# Patient Record
Sex: FEMALE | Race: WHITE | NOT HISPANIC OR LATINO
[De-identification: names, ages, dates, MRNs, and addresses within clinical notes are randomized per-mention and may not be internally consistent; named-entity substitution may affect disease eponyms.]

---

## 2017-02-23 ENCOUNTER — APPOINTMENT (OUTPATIENT)
Dept: FAMILY MEDICINE | Facility: CLINIC | Age: 52
End: 2017-02-23

## 2017-02-23 VITALS
SYSTOLIC BLOOD PRESSURE: 100 MMHG | DIASTOLIC BLOOD PRESSURE: 70 MMHG | BODY MASS INDEX: 21.69 KG/M2 | HEIGHT: 66 IN | WEIGHT: 135 LBS

## 2017-02-24 LAB
25(OH)D3 SERPL-MCNC: 10.8 NG/ML
ALBUMIN SERPL ELPH-MCNC: 4.3 G/DL
ALP BLD-CCNC: 62 U/L
ALT SERPL-CCNC: 13 U/L
ANION GAP SERPL CALC-SCNC: 15 MMOL/L
AST SERPL-CCNC: 21 U/L
BASOPHILS # BLD AUTO: 0.03 K/UL
BASOPHILS NFR BLD AUTO: 0.6 %
BILIRUB SERPL-MCNC: 0.6 MG/DL
BUN SERPL-MCNC: 13 MG/DL
CALCIUM SERPL-MCNC: 9.8 MG/DL
CHLORIDE SERPL-SCNC: 103 MMOL/L
CHOLEST SERPL-MCNC: 204 MG/DL
CHOLEST/HDLC SERPL: 2.8 RATIO
CO2 SERPL-SCNC: 25 MMOL/L
CREAT SERPL-MCNC: 1.14 MG/DL
EOSINOPHIL # BLD AUTO: 0.06 K/UL
EOSINOPHIL NFR BLD AUTO: 1.2 %
GLUCOSE SERPL-MCNC: 84 MG/DL
HBA1C MFR BLD HPLC: 6 %
HCT VFR BLD CALC: 41 %
HDLC SERPL-MCNC: 74 MG/DL
HGB BLD-MCNC: 13.4 G/DL
IMM GRANULOCYTES NFR BLD AUTO: 0 %
LDLC SERPL CALC-MCNC: 117 MG/DL
LYMPHOCYTES # BLD AUTO: 2.41 K/UL
LYMPHOCYTES NFR BLD AUTO: 49.8 %
MAN DIFF?: NORMAL
MCHC RBC-ENTMCNC: 29.3 PG
MCHC RBC-ENTMCNC: 32.7 GM/DL
MCV RBC AUTO: 89.5 FL
MONOCYTES # BLD AUTO: 0.35 K/UL
MONOCYTES NFR BLD AUTO: 7.2 %
NEUTROPHILS # BLD AUTO: 1.99 K/UL
NEUTROPHILS NFR BLD AUTO: 41.2 %
PLATELET # BLD AUTO: 180 K/UL
POTASSIUM SERPL-SCNC: 4 MMOL/L
PROT SERPL-MCNC: 7.2 G/DL
RBC # BLD: 4.58 M/UL
RBC # FLD: 13.9 %
SODIUM SERPL-SCNC: 143 MMOL/L
TRIGL SERPL-MCNC: 66 MG/DL
TSH SERPL-ACNC: 2.64 UIU/ML
WBC # FLD AUTO: 4.84 K/UL

## 2017-03-13 ENCOUNTER — MEDICATION RENEWAL (OUTPATIENT)
Age: 52
End: 2017-03-13

## 2017-03-27 ENCOUNTER — APPOINTMENT (OUTPATIENT)
Dept: INTERNAL MEDICINE | Facility: CLINIC | Age: 52
End: 2017-03-27

## 2017-03-27 VITALS
HEART RATE: 79 BPM | TEMPERATURE: 98.2 F | BODY MASS INDEX: 22.02 KG/M2 | DIASTOLIC BLOOD PRESSURE: 70 MMHG | WEIGHT: 137 LBS | HEIGHT: 66 IN | SYSTOLIC BLOOD PRESSURE: 113 MMHG | OXYGEN SATURATION: 100 %

## 2017-03-27 DIAGNOSIS — Z84.1 FAMILY HISTORY OF DISORDERS OF KIDNEY AND URETER: ICD-10-CM

## 2017-03-27 DIAGNOSIS — E11.9 TYPE 2 DIABETES MELLITUS W/OUT COMPLICATIONS: ICD-10-CM

## 2017-07-24 ENCOUNTER — RESULT REVIEW (OUTPATIENT)
Age: 52
End: 2017-07-24

## 2017-12-18 ENCOUNTER — APPOINTMENT (OUTPATIENT)
Dept: FAMILY MEDICINE | Facility: CLINIC | Age: 52
End: 2017-12-18
Payer: MEDICARE

## 2017-12-18 VITALS
BODY MASS INDEX: 21.53 KG/M2 | SYSTOLIC BLOOD PRESSURE: 120 MMHG | HEIGHT: 66 IN | DIASTOLIC BLOOD PRESSURE: 68 MMHG | WEIGHT: 134 LBS

## 2017-12-18 DIAGNOSIS — K52.9 NONINFECTIVE GASTROENTERITIS AND COLITIS, UNSPECIFIED: ICD-10-CM

## 2017-12-18 LAB — CYTOLOGY CVX/VAG DOC THIN PREP: NORMAL

## 2017-12-18 PROCEDURE — 99213 OFFICE O/P EST LOW 20 MIN: CPT

## 2017-12-31 ENCOUNTER — INPATIENT (INPATIENT)
Facility: HOSPITAL | Age: 52
LOS: 4 days | Discharge: HOME | End: 2018-01-05
Attending: INTERNAL MEDICINE | Admitting: INTERNAL MEDICINE
Payer: MEDICARE

## 2017-12-31 VITALS
TEMPERATURE: 100 F | DIASTOLIC BLOOD PRESSURE: 64 MMHG | HEART RATE: 101 BPM | OXYGEN SATURATION: 99 % | RESPIRATION RATE: 17 BRPM | WEIGHT: 126.99 LBS | HEIGHT: 67 IN | SYSTOLIC BLOOD PRESSURE: 116 MMHG

## 2017-12-31 LAB
ALBUMIN SERPL ELPH-MCNC: 3 G/DL — LOW (ref 3.3–5)
ALP SERPL-CCNC: 79 U/L — SIGNIFICANT CHANGE UP (ref 40–120)
ALT FLD-CCNC: 60 U/L — SIGNIFICANT CHANGE UP (ref 12–78)
ANION GAP SERPL CALC-SCNC: 7 MMOL/L — SIGNIFICANT CHANGE UP (ref 5–17)
APPEARANCE UR: CLEAR — SIGNIFICANT CHANGE UP
AST SERPL-CCNC: 41 U/L — HIGH (ref 15–37)
BACTERIA # UR AUTO: ABNORMAL
BASOPHILS # BLD AUTO: 0.2 K/UL — SIGNIFICANT CHANGE UP (ref 0–0.2)
BASOPHILS NFR BLD AUTO: 1.5 % — SIGNIFICANT CHANGE UP (ref 0–2)
BILIRUB SERPL-MCNC: 0.6 MG/DL — SIGNIFICANT CHANGE UP (ref 0.2–1.2)
BILIRUB UR-MCNC: NEGATIVE — SIGNIFICANT CHANGE UP
BUN SERPL-MCNC: 10 MG/DL — SIGNIFICANT CHANGE UP (ref 7–23)
CALCIUM SERPL-MCNC: 8.8 MG/DL — SIGNIFICANT CHANGE UP (ref 8.5–10.1)
CHLORIDE SERPL-SCNC: 99 MMOL/L — SIGNIFICANT CHANGE UP (ref 96–108)
CK MB CFR SERPL CALC: <0.5 NG/ML — SIGNIFICANT CHANGE UP (ref 0.5–3.6)
CO2 SERPL-SCNC: 30 MMOL/L — SIGNIFICANT CHANGE UP (ref 22–31)
COLOR SPEC: YELLOW — SIGNIFICANT CHANGE UP
CREAT SERPL-MCNC: 1.02 MG/DL — SIGNIFICANT CHANGE UP (ref 0.5–1.3)
DIFF PNL FLD: ABNORMAL
EOSINOPHIL # BLD AUTO: 0 K/UL — SIGNIFICANT CHANGE UP (ref 0–0.5)
EOSINOPHIL NFR BLD AUTO: 0.2 % — SIGNIFICANT CHANGE UP (ref 0–6)
EPI CELLS # UR: ABNORMAL
FLUAV SPEC QL CULT: NEGATIVE — SIGNIFICANT CHANGE UP
FLUBV AG SPEC QL IA: NEGATIVE — SIGNIFICANT CHANGE UP
GLUCOSE SERPL-MCNC: 101 MG/DL — HIGH (ref 70–99)
GLUCOSE UR QL: NEGATIVE MG/DL — SIGNIFICANT CHANGE UP
HCT VFR BLD CALC: 34.3 % — LOW (ref 34.5–45)
HGB BLD-MCNC: 11.3 G/DL — LOW (ref 11.5–15.5)
KETONES UR-MCNC: NEGATIVE — SIGNIFICANT CHANGE UP
LEUKOCYTE ESTERASE UR-ACNC: NEGATIVE — SIGNIFICANT CHANGE UP
LYMPHOCYTES # BLD AUTO: 1.5 K/UL — SIGNIFICANT CHANGE UP (ref 1–3.3)
LYMPHOCYTES # BLD AUTO: 13 % — SIGNIFICANT CHANGE UP (ref 13–44)
MAGNESIUM SERPL-MCNC: 2.3 MG/DL — SIGNIFICANT CHANGE UP (ref 1.6–2.6)
MCHC RBC-ENTMCNC: 29 PG — SIGNIFICANT CHANGE UP (ref 27–34)
MCHC RBC-ENTMCNC: 32.9 GM/DL — SIGNIFICANT CHANGE UP (ref 32–36)
MCV RBC AUTO: 87.9 FL — SIGNIFICANT CHANGE UP (ref 80–100)
MONOCYTES # BLD AUTO: 0.9 K/UL — SIGNIFICANT CHANGE UP (ref 0–0.9)
MONOCYTES NFR BLD AUTO: 7.5 % — SIGNIFICANT CHANGE UP (ref 2–14)
NEUTROPHILS # BLD AUTO: 9.1 K/UL — HIGH (ref 1.8–7.4)
NEUTROPHILS NFR BLD AUTO: 77.7 % — HIGH (ref 43–77)
NITRITE UR-MCNC: NEGATIVE — SIGNIFICANT CHANGE UP
PH UR: 6 — SIGNIFICANT CHANGE UP (ref 5–8)
PLATELET # BLD AUTO: 224 K/UL — SIGNIFICANT CHANGE UP (ref 150–400)
POTASSIUM SERPL-MCNC: 4.4 MMOL/L — SIGNIFICANT CHANGE UP (ref 3.5–5.3)
POTASSIUM SERPL-SCNC: 4.4 MMOL/L — SIGNIFICANT CHANGE UP (ref 3.5–5.3)
PROT SERPL-MCNC: 8.1 GM/DL — SIGNIFICANT CHANGE UP (ref 6–8.3)
PROT UR-MCNC: 15 MG/DL
RBC # BLD: 3.9 M/UL — SIGNIFICANT CHANGE UP (ref 3.8–5.2)
RBC # FLD: 13 % — SIGNIFICANT CHANGE UP (ref 11–15)
RBC CASTS # UR COMP ASSIST: SIGNIFICANT CHANGE UP /HPF (ref 0–4)
SODIUM SERPL-SCNC: 136 MMOL/L — SIGNIFICANT CHANGE UP (ref 135–145)
SP GR SPEC: 1.01 — SIGNIFICANT CHANGE UP (ref 1.01–1.02)
TROPONIN I SERPL-MCNC: 0.05 NG/ML — HIGH (ref 0.01–0.04)
UROBILINOGEN FLD QL: NEGATIVE MG/DL — SIGNIFICANT CHANGE UP
WBC # BLD: 11.6 K/UL — HIGH (ref 3.8–10.5)
WBC # FLD AUTO: 11.6 K/UL — HIGH (ref 3.8–10.5)

## 2017-12-31 PROCEDURE — 70450 CT HEAD/BRAIN W/O DYE: CPT | Mod: 26

## 2017-12-31 PROCEDURE — 62270 DX LMBR SPI PNXR: CPT

## 2017-12-31 PROCEDURE — 71010: CPT | Mod: 26

## 2017-12-31 PROCEDURE — 93010 ELECTROCARDIOGRAM REPORT: CPT

## 2017-12-31 PROCEDURE — 99285 EMERGENCY DEPT VISIT HI MDM: CPT | Mod: 25

## 2017-12-31 PROCEDURE — 99223 1ST HOSP IP/OBS HIGH 75: CPT

## 2017-12-31 RX ORDER — SODIUM CHLORIDE 9 MG/ML
2000 INJECTION INTRAMUSCULAR; INTRAVENOUS; SUBCUTANEOUS ONCE
Qty: 0 | Refills: 0 | Status: COMPLETED | OUTPATIENT
Start: 2017-12-31 | End: 2017-12-31

## 2017-12-31 RX ORDER — ACYCLOVIR SODIUM 500 MG
600 VIAL (EA) INTRAVENOUS ONCE
Qty: 0 | Refills: 0 | Status: COMPLETED | OUTPATIENT
Start: 2017-12-31 | End: 2018-01-01

## 2017-12-31 RX ORDER — SODIUM CHLORIDE 9 MG/ML
1000 INJECTION INTRAMUSCULAR; INTRAVENOUS; SUBCUTANEOUS ONCE
Qty: 0 | Refills: 0 | Status: COMPLETED | OUTPATIENT
Start: 2017-12-31 | End: 2017-12-31

## 2017-12-31 RX ORDER — METOCLOPRAMIDE HCL 10 MG
10 TABLET ORAL ONCE
Qty: 0 | Refills: 0 | Status: COMPLETED | OUTPATIENT
Start: 2017-12-31 | End: 2017-12-31

## 2017-12-31 RX ORDER — DEXAMETHASONE 0.5 MG/5ML
10 ELIXIR ORAL ONCE
Qty: 0 | Refills: 0 | Status: COMPLETED | OUTPATIENT
Start: 2017-12-31 | End: 2018-01-01

## 2017-12-31 RX ORDER — AMPICILLIN TRIHYDRATE 250 MG
2 CAPSULE ORAL ONCE
Qty: 0 | Refills: 0 | Status: COMPLETED | OUTPATIENT
Start: 2017-12-31 | End: 2018-01-01

## 2017-12-31 RX ORDER — CEFTRIAXONE 500 MG/1
2 INJECTION, POWDER, FOR SOLUTION INTRAMUSCULAR; INTRAVENOUS ONCE
Qty: 0 | Refills: 0 | Status: COMPLETED | OUTPATIENT
Start: 2017-12-31 | End: 2017-12-31

## 2017-12-31 RX ORDER — VANCOMYCIN HCL 1 G
1000 VIAL (EA) INTRAVENOUS ONCE
Qty: 0 | Refills: 0 | Status: COMPLETED | OUTPATIENT
Start: 2017-12-31 | End: 2018-01-01

## 2017-12-31 RX ORDER — DIPHENHYDRAMINE HCL 50 MG
25 CAPSULE ORAL ONCE
Qty: 0 | Refills: 0 | Status: COMPLETED | OUTPATIENT
Start: 2017-12-31 | End: 2017-12-31

## 2017-12-31 RX ORDER — DEXAMETHASONE 0.5 MG/5ML
10 ELIXIR ORAL ONCE
Qty: 0 | Refills: 0 | Status: DISCONTINUED | OUTPATIENT
Start: 2017-12-31 | End: 2017-12-31

## 2017-12-31 RX ADMIN — Medication 10 MILLIGRAM(S): at 19:17

## 2017-12-31 RX ADMIN — SODIUM CHLORIDE 1000 MILLILITER(S): 9 INJECTION INTRAMUSCULAR; INTRAVENOUS; SUBCUTANEOUS at 19:16

## 2017-12-31 RX ADMIN — SODIUM CHLORIDE 500 MILLILITER(S): 9 INJECTION INTRAMUSCULAR; INTRAVENOUS; SUBCUTANEOUS at 23:30

## 2017-12-31 RX ADMIN — CEFTRIAXONE 100 GRAM(S): 500 INJECTION, POWDER, FOR SOLUTION INTRAMUSCULAR; INTRAVENOUS at 23:30

## 2017-12-31 RX ADMIN — Medication 25 MILLIGRAM(S): at 19:17

## 2017-12-31 NOTE — ED ADULT TRIAGE NOTE - CHIEF COMPLAINT QUOTE
headache left side today and history of 2 strokes. She not feeling well, decreased appetite and weight loss

## 2017-12-31 NOTE — H&P ADULT - ASSESSMENT
52 woman with PMH of stroke x2 resulting in residual left sided weakness, mitral valve regurgitation presents with headache, weakness, mild confusion or "not feeling like myself" and poor appetite x 2 weeks; she also notes 20 pound weight loss over the past 2 weeks.  Signs, symptoms, and work up consistent with likely Meningitis.  Patient will require admission for at least 2 midnights as detailed below:    IMPROVE VTE Individual Risk Assessment          RISK                                                          Points    [  ] Previous VTE                                                3    [  ] Thrombophilia                                             2    [  ] Lower limb paralysis                                    2        (unable to hold up >15 seconds)      [  ] Current Cancer                                             2         (within 6 months)    [  ] Immobilization > 24 hrs                              1    [  ] ICU/CCU stay > 24 hours                            1    [  ] Age > 60                                                    1    IMPROVE VTE Score ____0_____

## 2017-12-31 NOTE — H&P ADULT - PROBLEM SELECTOR PLAN 1
Neurochecks Q2hrs.  Lumber puncture performed, follow up results.  Vancomycin 1gm IVPB Q12hrs  Ceftriaxone 2gm IVPB Q12hrs.  Ampicillin 1gm IVPB Q4hrs.  Acyclovir 600 mg IVPB q8h  Dexamethasone 10mg IVPB Q6hrs.  ID and Neurology consults  Blood cx 2 sets drawn in ER, f/u culture reports

## 2017-12-31 NOTE — H&P ADULT - HISTORY OF PRESENT ILLNESS
In progress, admitted for Meningitis. 52 woman with PMH of stroke x2 resulting in residual left sided weakness, mitral valve regurgitation presents with headache, weakness, mild confusion or "not feeling like myself" and poor appetite x 2 weeks; she also notes 20 pound weight loss over the past 2 weeks.  She denies fever, chills, photophobia, recent sick contacts, travel outside of [place], neck pain.  She says she take no medications and is otherwise healthy.    In the ED, patient's CT head read as likely infection, leptomeningial disease; less likely hemorrhage.  LP done in ED, results pending.  Tmax 102, CBC shows leukocytosis with left shift, troponin mildly elevated.  EKG NSR.

## 2017-12-31 NOTE — H&P ADULT - PROBLEM SELECTOR PLAN 3
Early ambulation for DVT prophylaxis   General precautions Very unlikely ACS given presentation   Will order second set

## 2017-12-31 NOTE — ED PROVIDER NOTE - MEDICAL DECISION MAKING DETAILS
patient pw weakness and poor appetite. Flu is a consideration, especially given fever in the ER. I have low suspicion for meningitis given the ha started gradually in the er while patient was waiting and had not eaten. it is not associated with meningeal signs such as jolt accentuation or kernigs. she has no photophobia. patients trop is elevated, which may be due to her MVR. Patient will need ct scan of the head. I read ekg as sisnus rhythm with inverted ts in the septal leads but no other ischemic changes or interval abnormalities. will need to admit. patient pw weakness and poor appetite. Flu is a consideration, especially given fever in the ER. I have low suspicion for meningitis given the ha started gradually in the er while patient was waiting and had not eaten. it is not associated with meningeal signs such as jolt accentuation or kernigs. she has no photophobia. patients trop is elevated, which may be due to her MVR. Patient will need ct scan of the head. I read ekg as sisnus rhythm with inverted ts in the septal leads but no other ischemic changes or interval abnormalities. will need to admit.  dago simon 11:50pm - pt was a signout pending CT. CT findings concerning for meningitis. ordered abx/steroids. and will perform LP. pt admitted. pt alert/oriented, answering questions appropriately, dev fever but SBP wnl. d/w pt and  regarding ppx abx - william bring their kids in for ppx abx.

## 2017-12-31 NOTE — ED PROVIDER NOTE - OBJECTIVE STATEMENT
Pertinent PMH/PSH/FHx/SHx and Review of Systems contained within:  52F hx of stroke x2, resulting in residual left sided weakness, mitral valve regurgitation pw headache, weakness and poor appetite. notes 20 pound weightloss over the past 2 weeks. no cough, congestion, bodyaches, fever, or chills. headache began while in the ER, gradual onset.   Fh and Sh not otherwise contributory  ROS otherwise negative

## 2017-12-31 NOTE — H&P ADULT - NSHPREVIEWOFSYSTEMS_GEN_ALL_CORE
No fever/chills, No photophobia/eye pain/changes in vision,  No chest pain/palpitations, no SOB/cough/wheeze/stridor, No abdominal pain, No N/V/D, no dysuria/frequency/discharge, No neck/back pain, no rash.

## 2017-12-31 NOTE — H&P ADULT - NSHPLABSRESULTS_GEN_ALL_CORE
Vital Signs Last 24 Hrs  T(C): 38 (31 Dec 2017 19:52), Max: 38.9 (31 Dec 2017 17:25)  T(F): 100.4 (31 Dec 2017 19:52), Max: 102 (31 Dec 2017 17:25)  HR: 88 (2018 02:15) (88 - 101)  BP: 110/42 (2018 02:15) (96/53 - 116/64)  BP(mean): --  RR: 18 (2018 02:15) (16 - 18)  SpO2: 97% (2018 02:15) (96% - 100%)        LABS:                        11.3   11.6  )-----------( 224      ( 31 Dec 2017 19:15 )             34.3         136  |  99  |  10  ----------------------------<  101<H>  4.4   |  30  |  1.02    Ca    8.8      31 Dec 2017 19:15  Mg     2.3         TPro  8.1  /  Alb  3.0<L>  /  TBili  0.6  /  DBili  x   /  AST  41<H>  /  ALT  60  /  AlkPhos  79  12      Urinalysis Basic - ( 31 Dec 2017 20:13 )    Color: Yellow / Appearance: Clear / S.010 / pH: x  Gluc: x / Ketone: Negative  / Bili: Negative / Urobili: Negative mg/dL   Blood: x / Protein: 15 mg/dL / Nitrite: Negative   Leuk Esterase: Negative / RBC: 0-2 /HPF / WBC x   Sq Epi: x / Non Sq Epi: Moderate / Bacteria: Few        RADIOLOGY & ADDITIONAL STUDIES:    CT head:  IMPRESSION:   Areas of hyperattenuation along the right central sulcus and left   temporal occipital region. Differential diagnostic considerations include   but are not limited to infection (i.e. meningitis), leptomeningeal   disease, and less likely hemorrhage given the multifocality without the   history of trauma in an atypical location for spontaneous hemorrhage.   Meningitis should be excluded on a clinical basis.

## 2018-01-01 DIAGNOSIS — G03.9 MENINGITIS, UNSPECIFIED: ICD-10-CM

## 2018-01-01 DIAGNOSIS — I63.9 CEREBRAL INFARCTION, UNSPECIFIED: ICD-10-CM

## 2018-01-01 DIAGNOSIS — Z29.9 ENCOUNTER FOR PROPHYLACTIC MEASURES, UNSPECIFIED: ICD-10-CM

## 2018-01-01 DIAGNOSIS — R74.8 ABNORMAL LEVELS OF OTHER SERUM ENZYMES: ICD-10-CM

## 2018-01-01 LAB
ANION GAP SERPL CALC-SCNC: 8 MMOL/L — SIGNIFICANT CHANGE UP (ref 5–17)
APPEARANCE CSF: CLEAR — SIGNIFICANT CHANGE UP
BUN SERPL-MCNC: 8 MG/DL — SIGNIFICANT CHANGE UP (ref 7–23)
CALCIUM SERPL-MCNC: 8.5 MG/DL — SIGNIFICANT CHANGE UP (ref 8.5–10.1)
CHLORIDE SERPL-SCNC: 103 MMOL/L — SIGNIFICANT CHANGE UP (ref 96–108)
CK MB BLD-MCNC: <0.7 % — SIGNIFICANT CHANGE UP (ref 0–3.5)
CK MB CFR SERPL CALC: <0.5 NG/ML — SIGNIFICANT CHANGE UP (ref 0.5–3.6)
CK SERPL-CCNC: 74 U/L — SIGNIFICANT CHANGE UP (ref 26–192)
CO2 SERPL-SCNC: 27 MMOL/L — SIGNIFICANT CHANGE UP (ref 22–31)
COLOR CSF: SIGNIFICANT CHANGE UP
CREAT SERPL-MCNC: 0.97 MG/DL — SIGNIFICANT CHANGE UP (ref 0.5–1.3)
CULTURE RESULTS: NO GROWTH — SIGNIFICANT CHANGE UP
GLUCOSE CSF-MCNC: 54 MG/DL — SIGNIFICANT CHANGE UP (ref 40–70)
GLUCOSE SERPL-MCNC: 126 MG/DL — HIGH (ref 70–99)
GRAM STN FLD: SIGNIFICANT CHANGE UP
HCT VFR BLD CALC: 35.2 % — SIGNIFICANT CHANGE UP (ref 34.5–45)
HGB BLD-MCNC: 11.5 G/DL — SIGNIFICANT CHANGE UP (ref 11.5–15.5)
LACTATE SERPL-SCNC: 0.8 MMOL/L — SIGNIFICANT CHANGE UP (ref 0.7–2)
LDH CSF L TO P-CCNC: 17 U/L — SIGNIFICANT CHANGE UP
LDH FLD-CCNC: 17 U/L — SIGNIFICANT CHANGE UP
LYMPHOCYTES # CSF: 1 % — LOW (ref 40–80)
MCHC RBC-ENTMCNC: 29 PG — SIGNIFICANT CHANGE UP (ref 27–34)
MCHC RBC-ENTMCNC: 32.8 GM/DL — SIGNIFICANT CHANGE UP (ref 32–36)
MCV RBC AUTO: 88.6 FL — SIGNIFICANT CHANGE UP (ref 80–100)
NEUTROPHILS # CSF: 0 % — SIGNIFICANT CHANGE UP (ref 0–6)
NIGHT BLUE STAIN TISS: SIGNIFICANT CHANGE UP
NRBC NFR CSF: 0 /UL — SIGNIFICANT CHANGE UP (ref 0–5)
PLATELET # BLD AUTO: 230 K/UL — SIGNIFICANT CHANGE UP (ref 150–400)
POTASSIUM SERPL-MCNC: 4.4 MMOL/L — SIGNIFICANT CHANGE UP (ref 3.5–5.3)
POTASSIUM SERPL-SCNC: 4.4 MMOL/L — SIGNIFICANT CHANGE UP (ref 3.5–5.3)
PROT CSF-MCNC: 48 MG/DL — HIGH (ref 15–45)
RBC # BLD: 3.97 M/UL — SIGNIFICANT CHANGE UP (ref 3.8–5.2)
RBC # CSF: 6 /UL — HIGH (ref 0–0)
RBC # FLD: 13.4 % — SIGNIFICANT CHANGE UP (ref 11–15)
SODIUM SERPL-SCNC: 138 MMOL/L — SIGNIFICANT CHANGE UP (ref 135–145)
SPECIMEN SOURCE: SIGNIFICANT CHANGE UP
TROPONIN I SERPL-MCNC: 0.05 NG/ML — HIGH (ref 0.01–0.04)
TUBE TYPE: SIGNIFICANT CHANGE UP
WBC # BLD: 14 K/UL — HIGH (ref 3.8–10.5)
WBC # FLD AUTO: 14 K/UL — HIGH (ref 3.8–10.5)

## 2018-01-01 PROCEDURE — 99233 SBSQ HOSP IP/OBS HIGH 50: CPT

## 2018-01-01 PROCEDURE — 70553 MRI BRAIN STEM W/O & W/DYE: CPT | Mod: 26

## 2018-01-01 PROCEDURE — 70496 CT ANGIOGRAPHY HEAD: CPT | Mod: 26

## 2018-01-01 PROCEDURE — 70544 MR ANGIOGRAPHY HEAD W/O DYE: CPT | Mod: 26,59

## 2018-01-01 RX ORDER — ACYCLOVIR SODIUM 500 MG
600 VIAL (EA) INTRAVENOUS EVERY 8 HOURS
Qty: 0 | Refills: 0 | Status: DISCONTINUED | OUTPATIENT
Start: 2018-01-01 | End: 2018-01-03

## 2018-01-01 RX ORDER — VANCOMYCIN HCL 1 G
1000 VIAL (EA) INTRAVENOUS EVERY 12 HOURS
Qty: 0 | Refills: 0 | Status: DISCONTINUED | OUTPATIENT
Start: 2018-01-01 | End: 2018-01-01

## 2018-01-01 RX ORDER — CEFTRIAXONE 500 MG/1
1 INJECTION, POWDER, FOR SOLUTION INTRAMUSCULAR; INTRAVENOUS EVERY 24 HOURS
Qty: 0 | Refills: 0 | Status: DISCONTINUED | OUTPATIENT
Start: 2018-01-02 | End: 2018-01-03

## 2018-01-01 RX ORDER — CEFTRIAXONE 500 MG/1
2 INJECTION, POWDER, FOR SOLUTION INTRAMUSCULAR; INTRAVENOUS EVERY 12 HOURS
Qty: 0 | Refills: 0 | Status: DISCONTINUED | OUTPATIENT
Start: 2018-01-01 | End: 2018-01-01

## 2018-01-01 RX ORDER — DEXAMETHASONE 0.5 MG/5ML
10 ELIXIR ORAL EVERY 6 HOURS
Qty: 0 | Refills: 0 | Status: DISCONTINUED | OUTPATIENT
Start: 2018-01-01 | End: 2018-01-03

## 2018-01-01 RX ORDER — AMPICILLIN TRIHYDRATE 250 MG
2 CAPSULE ORAL EVERY 6 HOURS
Qty: 0 | Refills: 0 | Status: DISCONTINUED | OUTPATIENT
Start: 2018-01-01 | End: 2018-01-01

## 2018-01-01 RX ADMIN — Medication 102 MILLIGRAM(S): at 15:52

## 2018-01-01 RX ADMIN — CEFTRIAXONE 100 GRAM(S): 500 INJECTION, POWDER, FOR SOLUTION INTRAMUSCULAR; INTRAVENOUS at 17:48

## 2018-01-01 RX ADMIN — Medication 102 MILLIGRAM(S): at 03:25

## 2018-01-01 RX ADMIN — Medication 250 MILLIGRAM(S): at 03:25

## 2018-01-01 RX ADMIN — Medication 216 GRAM(S): at 00:02

## 2018-01-01 RX ADMIN — Medication 216 GRAM(S): at 15:54

## 2018-01-01 RX ADMIN — Medication 112 MILLIGRAM(S): at 01:35

## 2018-01-01 RX ADMIN — Medication 112 MILLIGRAM(S): at 18:46

## 2018-01-01 RX ADMIN — Medication 112 MILLIGRAM(S): at 10:21

## 2018-01-01 NOTE — CONSULT NOTE ADULT - ASSESSMENT
Possible meningitis  Anorexia  2 Old strokes (1993, 2003)    - MRI brain wwo cezar, MRA head wo cezar  - follow up pending CSF results  - ID consult  - EEG
leucocytosis   lumbar puncture is NEGATIVE   mri noted   empiric antibiotics till sepsis work up is back   ua NEGATIVE   chest xray NEGATIVE   neuro follow up   lumbar puncture is NEGATIVE   will follow with you thanks

## 2018-01-01 NOTE — CONSULT NOTE ADULT - SUBJECTIVE AND OBJECTIVE BOX
HPI: 52 year old woman with hx of 2 strokes presenting with 2 week history of anorexia. She says she has a loss of appetite and only has been eating small meals here and there because she knows to eat. She has lost weight because of her poor appetite. She denies headaches, vision changes, new focal weakness, confusion, seizures, or paresthesias. She came to ER because she wants to know why she has a loss of appetite. She had a stroke in  (age 28) and etiology is unknown. She had another stroke in  (age 38) also etiology unknown. She was on aspirin and then Plavix but both medications caused GI bleeds and ulcers. She has not been on antiplatelet therapy. She denies any mew stroke symptoms. Ct head in ER showed possible meningitis and had LP.    PMHx: Stroke (, )  PSHx:   FHx: 3 sisters  from pancreatic cancer, MI, and respiratory failure in late 50s and early 60s, father  in old age and mother alive with DM2  Social Hx: non-smoker, no Etoh, no illicit drug use, takes care of 3 children and one is autistic  Meds: see MAR  Allergies: NKDA  ROS: loss of appetite    Vitals: Temp 98F (Tmax 102F)   HR 88   /58   RR 18  General: NAD, neck supple  Neuro: AOx3. No dysarthria, No aphasia. EOM intact. PERRL. VFF. No facial droop. Tongue is midline. Palate elevates symmetrically. Shoulder shrug is intact. Left hemiparesis with spasticity. Finger to nose intact on right and heel to shin intact bilaterally. Sensory intact to touch. Reflexes symmetric and brisker in on left and toes down.     Labs: WBC 14, lactate 0.8, CSF ( 54 glucose, 48 protein, 0 WBC, 6 RBC)  CT head- Areas of hyperattenuation along the right central sulcus and left   temporal occipital region. Differential diagnostic considerations include   but are not limited to infection (i.e. meningitis), leptomeningeal   disease, and less likely hemorrhage given the multifocality without the   history of trauma in an atypical location for spontaneous hemorrhage.   Meningitis should be excluded on a clinical basis.

## 2018-01-01 NOTE — PROGRESS NOTE ADULT - SUBJECTIVE AND OBJECTIVE BOX
Pt seen and examined, no event overnight.    No photophobia/eye pain/changes in vision,  No chest pain/palpitations, no SOB/cough/wheeze/stridor, No abdominal pain, No N/V/D, no dysuria/frequency/discharge, No neck/back pain, no rash      MEDICATIONS  (STANDING):  acyclovir IVPB 600 milliGRAM(s) IV Intermittent every 8 hours  ampicillin  IVPB 2 Gram(s) IV Intermittent every 6 hours  cefTRIAXone   IVPB 2 Gram(s) IV Intermittent every 12 hours  dexamethasone  IVPB 10 milliGRAM(s) IV Intermittent every 6 hours  vancomycin  IVPB 1000 milliGRAM(s) IV Intermittent every 12 hours    MEDICATIONS  (PRN):      Allergies    No Known Allergies        Vital Signs Last 24 Hrs  T(C): 37.3 (2018 07:55), Max: 38.9 (31 Dec 2017 17:25)  T(F): 99.1 (2018 07:55), Max: 102 (31 Dec 2017 17:25)  HR: 74 (2018 07:55) (74 - 101)  BP: 107/46 (2018 07:55) (96/53 - 116/64)  BP(mean): --  RR: 18 (2018 07:55) (16 - 18)  SpO2: 97% (2018 07:55) (96% - 100%)    PHYSICAL EXAM:    GENERAL: NAD, well-groomed, ill-appearing  	HEAD:  Atraumatic, Normocephalic  	EYES: EOMI, PERRLA, conjunctiva and sclera clear  	ENMT: No tonsillar erythema, exudates, or enlargement; Moist mucous membranes, No lesions  	NECK: Supple, No JVD, Normal thyroid  	NERVOUS SYSTEM:  Alert & Oriented X3, Good concentration DTRs 2+ intact and symmetric, strength 5/5, CN 2-12 intact, no meningeal signs  	CHEST/LUNG: Clear to ascultation bilaterally; No rales, rhonchi, wheezing, or rubs  	HEART: Regular rate and rhythm; No murmurs, rubs, or gallops  	ABDOMEN: Soft, Nontender, Nondistended; Bowel sounds present  	EXTREMITIES: No clubbing, cyanosis, or edema  	SKIN: no rashes or lesions   PSYCH: normal affect and behavior      LABS:                        11.5   14.0  )-----------( 230      ( 2018 07:52 )             35.2         138  |  103  |  8   ----------------------------<  126<H>  4.4   |  27  |  0.97    Ca    8.5      2018 07:52  Mg     2.3     12-31    TPro  8.1  /  Alb  3.0<L>  /  TBili  0.6  /  DBili  x   /  AST  41<H>  /  ALT  60  /  AlkPhos  79  12-      Urinalysis Basic - ( 31 Dec 2017 20:13 )    Color: Yellow / Appearance: Clear / S.010 / pH: x  Gluc: x / Ketone: Negative  / Bili: Negative / Urobili: Negative mg/dL   Blood: x / Protein: 15 mg/dL / Nitrite: Negative   Leuk Esterase: Negative / RBC: 0-2 /HPF / WBC x   Sq Epi: x / Non Sq Epi: Moderate / Bacteria: Few

## 2018-01-01 NOTE — PROGRESS NOTE ADULT - ASSESSMENT
52 woman with PMH of stroke x2 resulting in residual left sided weakness, mitral valve regurgitation presents with headache, weakness, mild confusion or "not feeling like myself" and poor appetite x 2 weeks; she also notes 20 pound weight loss over the past 2 weeks.  Signs, symptoms, and work up consistent with likely Meningitis.

## 2018-01-01 NOTE — CONSULT NOTE ADULT - SUBJECTIVE AND OBJECTIVE BOX
52 year old woman with hx of 2 strokes presenting with 2 week history of anorexia. She says she has a loss of appetite and only has been eating small meals here and there because she knows to eat. She has lost weight because of her poor appetite. She denies headaches, vision changes, new focal weakness, confusion, seizures, or paresthesias. She came to ER because she wants to know why she has a loss of appetite. She had a stroke in  (age 28) and etiology is unknown. She had another stroke in  (age 38) also etiology unknown. She was on aspirin and then Plavix but both medications caused GI bleeds and ulcers. She has not been on antiplatelet therapy. She denies any mew stroke symptoms. Ct head in ER showed possible meningitis and had LP.    No Known Allergies    Intolerances        MEDICATIONS  (STANDING):  acyclovir IVPB 600 milliGRAM(s) IV Intermittent every 8 hours  ampicillin  IVPB 2 Gram(s) IV Intermittent every 6 hours  cefTRIAXone   IVPB 2 Gram(s) IV Intermittent every 12 hours  dexamethasone  IVPB 10 milliGRAM(s) IV Intermittent every 6 hours  vancomycin  IVPB 1000 milliGRAM(s) IV Intermittent every 12 hours    MEDICATIONS  (PRN):      REVIEW OF SYSTEMS:    CONSTITUTIONAL: No fever, chills, weight loss, or fatigue  HEENT: No sore throat, runny nose, ear ache  RESPIRATORY: No cough, wheezing, No shortness of breath  CARDIOVASCULAR: No chest pain, palpitations, dizziness  GASTROINTESTINAL: No abdominal pain. No nausea, vomiting, diarrhea  GENITOURINARY: No dysuria, increase frequency, hematuria, or incontinence  NEUROLOGICAL: No headaches, memory loss, loss of strength, numbness, or tremors, no weakness  EXTREMITY: No pedal edema BLE  SKIN: No itching, burning, rashes, or lesions     VITAL SIGNS:  T(C): 36.7 (18 @ 16:01), Max: 38 (17 @ 19:52)  T(F): 98 (18 @ 16:01), Max: 100.4 (17 @ 19:52)  HR: 74 (18 @ 16:01) (74 - 93)  BP: 111/49 (18 @ 16:01) (96/53 - 111/49)  RR: 17 (18 @ 16:01) (16 - 18)  SpO2: 96% (18 @ 16:01) (96% - 98%)  Wt(kg): --    PHYSICAL EXAM:    GENERAL: not in any distress  HEENT: Neck is supple, normocephalic, atraumatic   CHEST/LUNG: Clear to auscultation bilaterally; No rales, rhonchi, wheezing  HEART: Regular rate and rhythm; No murmurs, rubs, or gallops  ABDOMEN: Soft, Nontender, Nondistended; Bowel sounds present, no rebound   EXTREMITIES:  2+ Peripheral Pulses, No clubbing, cyanosis, or edema  GENITOURINARY:   SKIN: No rashes or lesions  BACK: no pressor sore   NERVOUS SYSTEM:  Alert & Oriented X3, Good concentration  PSYCH: normal affect     LABS:                         11.5   14.0  )-----------( 230      ( 2018 07:52 )             35.2         138  |  103  |  8   ----------------------------<  126<H>  4.4   |  27  |  0.97    Ca    8.5      2018 07:52  Mg     2.3     12-    TPro  8.1  /  Alb  3.0<L>  /  TBili  0.6  /  DBili  x   /  AST  41<H>  /  ALT  60  /  AlkPhos  79  12-    LIVER FUNCTIONS - ( 31 Dec 2017 19:15 )  Alb: 3.0 g/dL / Pro: 8.1 gm/dL / ALK PHOS: 79 U/L / ALT: 60 U/L / AST: 41 U/L / GGT: x             Urinalysis Basic - ( 31 Dec 2017 20:13 )    Color: Yellow / Appearance: Clear / S.010 / pH: x  Gluc: x / Ketone: Negative  / Bili: Negative / Urobili: Negative mg/dL   Blood: x / Protein: 15 mg/dL / Nitrite: Negative   Leuk Esterase: Negative / RBC: 0-2 /HPF / WBC x   Sq Epi: x / Non Sq Epi: Moderate / Bacteria: Few        CARDIAC MARKERS ( 2018 07:52 )  .052 ng/mL / x     / 74 U/L / x     / <0.5 ng/mL  CARDIAC MARKERS ( 31 Dec 2017 19:15 )  .053 ng/mL / x     / x     / x     / <0.5 ng/mL                    Culture Results:   No growth ( @ 23:11)                Radiology: 52 year old woman with hx of 2 strokes presenting with 2 week history of anorexia. She says she has a loss of appetite and only has been eating small meals here and there because she knows to eat. She has lost weight because of her poor appetite. She denies headaches, vision changes, new focal weakness, confusion, seizures, or paresthesias. She came to ER because she wants to know why she has a loss of appetite. She had a stroke in  (age 28) and etiology is unknown. She had another stroke in  (age 38) also etiology unknown. She was on aspirin and then Plavix but both medications caused GI bleeds and ulcers. She has not been on antiplatelet therapy. She denies any mew stroke symptoms. Ct head in ER showed possible meningitis and had LP.    No Known Allergies    Intolerances        MEDICATIONS  (STANDING):  acyclovir IVPB 600 milliGRAM(s) IV Intermittent every 8 hours  ampicillin  IVPB 2 Gram(s) IV Intermittent every 6 hours  cefTRIAXone   IVPB 2 Gram(s) IV Intermittent every 12 hours  dexamethasone  IVPB 10 milliGRAM(s) IV Intermittent every 6 hours  vancomycin  IVPB 1000 milliGRAM(s) IV Intermittent every 12 hours    MEDICATIONS  (PRN):      REVIEW OF SYSTEMS:    unable to obtain     VITAL SIGNS:  T(C): 36.7 (18 @ 16:01), Max: 38 (17 @ 19:52)  T(F): 98 (18 @ 16:01), Max: 100.4 (17 @ 19:52)  HR: 74 (18 @ 16:01) (74 - 93)  BP: 111/49 (18 @ 16:01) (96/53 - 111/49)  RR: 17 (18 @ 16:01) (16 - 18)  SpO2: 96% (18 @ 16:01) (96% - 98%)  Wt(kg): --    PHYSICAL EXAM:    GENERAL: not in any distress  HEENT: Neck is supple, normocephalic, atraumatic   CHEST/LUNG: Clear to auscultation bilaterally; No rales, rhonchi, wheezing  HEART: Regular rate and rhythm; No murmurs, rubs, or gallops  ABDOMEN: Soft, Nontender, Nondistended; Bowel sounds present, no rebound   EXTREMITIES:  2+ Peripheral Pulses, No clubbing, cyanosis, or edema  SKIN: No rashes or lesions  BACK: no pressor sore   NERVOUS SYSTEM:  alert and oriented x3; left hemipareses   PSYCH: normal affect     LABS:                         11.5   14.0  )-----------( 230      ( 2018 07:52 )             35.2         138  |  103  |  8   ----------------------------<  126<H>  4.4   |  27  |  0.97    Ca    8.5      2018 07:52  Mg     2.3         TPro  8.1  /  Alb  3.0<L>  /  TBili  0.6  /  DBili  x   /  AST  41<H>  /  ALT  60  /  AlkPhos  79      LIVER FUNCTIONS - ( 31 Dec 2017 19:15 )  Alb: 3.0 g/dL / Pro: 8.1 gm/dL / ALK PHOS: 79 U/L / ALT: 60 U/L / AST: 41 U/L / GGT: x             Urinalysis Basic - ( 31 Dec 2017 20:13 )    Color: Yellow / Appearance: Clear / S.010 / pH: x  Gluc: x / Ketone: Negative  / Bili: Negative / Urobili: Negative mg/dL   Blood: x / Protein: 15 mg/dL / Nitrite: Negative   Leuk Esterase: Negative / RBC: 0-2 /HPF / WBC x   Sq Epi: x / Non Sq Epi: Moderate / Bacteria: Few        CARDIAC MARKERS ( 2018 07:52 )  .052 ng/mL / x     / 74 U/L / x     / <0.5 ng/mL  CARDIAC MARKERS ( 31 Dec 2017 19:15 )  .053 ng/mL / x     / x     / x     / <0.5 ng/mL                    Culture Results:   No growth ( @ 23:11)                Radiology:    < from: MR Head w/wo IV Cont (18 @ 20:21) >  IMPRESSION:         Likely bilateral subarachnoid hemorrhage. Left occipital periventricular   enhancing focus of hemorrhage. Underlying lesion cannot be entirely   excluded in   this area.    Punctate acute to subacute right parietal periventricular infarction    THIS REPORT CONTAINS FINDINGS THAT MAY BE CRITICAL TO PATIENT CARE. The   findings were verbally communicated via telephone conference with Dr. Bradshaw   at 8:54 PM EST on 2018. The findings were acknowledged and understood.          < end of copied text >    < from: MR Angio Head No Cont (18 @ 20:16) >  IMPRESSION:           Normal head/brain MRA.          ******PRELIMINARY REPORT******    ******PRELIMINARY REPORT******              POOJA GAMEZ M.D.;[facility] RADIOLOGIST                  < end of copied text >  < from: CT Head No Cont (17 @ 22:04) >  IMPRESSION:     Areas of hyperattenuation along the right central sulcus and left   temporal occipital region. Differential diagnostic considerations include   but are not limited to infection (i.e. meningitis), leptomeningeal   disease, and less likely hemorrhage given the multifocality without the   history of trauma in an atypical location for spontaneous hemorrhage.   Meningitis should be excluded on a clinical basis.    Dr. Joyner discussed the above findings with Dr. Crenshaw at 10:55 PM on   2017 with read back.                SABI JOYNER M.D., ATTENDING RADIOLOGIST  This document has been electronically signed. Dec 31 2017 10:59PM              < end of copied text > 52 year old woman with hx of 2 strokes presenting with 2 week history of anorexia. She says she has a loss of appetite and only has been eating small meals here and there because she knows to eat. She has lost weight because of her poor appetite. She denies headaches, vision changes, new focal weakness, confusion, seizures, or paresthesias. She came to ER because she wants to know why she has a loss of appetite. She had a stroke in  (age 28) and etiology is unknown. She had another stroke in  (age 38) also etiology unknown. She was on aspirin and then Plavix but both medications caused GI bleeds and ulcers. She has not been on antiplatelet therapy. She denies any mew stroke symptoms. Ct head in ER showed possible meningitis and had LP.    No Known Allergies    Intolerances        MEDICATIONS  (STANDING):  acyclovir IVPB 600 milliGRAM(s) IV Intermittent every 8 hours  ampicillin  IVPB 2 Gram(s) IV Intermittent every 6 hours  cefTRIAXone   IVPB 2 Gram(s) IV Intermittent every 12 hours  dexamethasone  IVPB 10 milliGRAM(s) IV Intermittent every 6 hours  vancomycin  IVPB 1000 milliGRAM(s) IV Intermittent every 12 hours    MEDICATIONS  (PRN):      REVIEW OF SYSTEMS:  apparently was confused earlier   now fine   had fevers   no cough short of breath   no nausea vomiting diarrhea     VITAL SIGNS:  T(C): 36.7 (18 @ 16:01), Max: 38 (17 @ 19:52)  T(F): 98 (18 @ 16:01), Max: 100.4 (17 @ 19:52)  HR: 74 (18 @ 16:01) (74 - 93)  BP: 111/49 (18 @ 16:01) (96/53 - 111/49)  RR: 17 (18 @ 16:01) (16 - 18)  SpO2: 96% (18 @ 16:01) (96% - 98%)  Wt(kg): --    PHYSICAL EXAM:    GENERAL: not in any distress  HEENT: Neck is supple, normocephalic, atraumatic   CHEST/LUNG: Clear to auscultation bilaterally; No rales, rhonchi, wheezing  HEART: Regular rate and rhythm; No murmurs, rubs, or gallops  ABDOMEN: Soft, Nontender, Nondistended; Bowel sounds present, no rebound   EXTREMITIES:  2+ Peripheral Pulses, No clubbing, cyanosis, or edema  SKIN: No rashes or lesions  BACK: no pressor sore   NERVOUS SYSTEM:  alert and oriented x3; left hemipareses   PSYCH: normal affect     LABS:                         11.5   14.0  )-----------( 230      ( 2018 07:52 )             35.2         138  |  103  |  8   ----------------------------<  126<H>  4.4   |  27  |  0.97    Ca    8.5      2018 07:52  Mg     2.3         TPro  8.1  /  Alb  3.0<L>  /  TBili  0.6  /  DBili  x   /  AST  41<H>  /  ALT  60  /  AlkPhos  79      LIVER FUNCTIONS - ( 31 Dec 2017 19:15 )  Alb: 3.0 g/dL / Pro: 8.1 gm/dL / ALK PHOS: 79 U/L / ALT: 60 U/L / AST: 41 U/L / GGT: x             Urinalysis Basic - ( 31 Dec 2017 20:13 )    Color: Yellow / Appearance: Clear / S.010 / pH: x  Gluc: x / Ketone: Negative  / Bili: Negative / Urobili: Negative mg/dL   Blood: x / Protein: 15 mg/dL / Nitrite: Negative   Leuk Esterase: Negative / RBC: 0-2 /HPF / WBC x   Sq Epi: x / Non Sq Epi: Moderate / Bacteria: Few        CARDIAC MARKERS ( 2018 07:52 )  .052 ng/mL / x     / 74 U/L / x     / <0.5 ng/mL  CARDIAC MARKERS ( 31 Dec 2017 19:15 )  .053 ng/mL / x     / x     / x     / <0.5 ng/mL                    Culture Results:   No growth ( @ 23:11)                Radiology:    < from: MR Head w/wo IV Cont (18 @ 20:21) >  IMPRESSION:         Likely bilateral subarachnoid hemorrhage. Left occipital periventricular   enhancing focus of hemorrhage. Underlying lesion cannot be entirely   excluded in   this area.    Punctate acute to subacute right parietal periventricular infarction    THIS REPORT CONTAINS FINDINGS THAT MAY BE CRITICAL TO PATIENT CARE. The   findings were verbally communicated via telephone conference with Dr. Bradshaw   at 8:54 PM EST on 2018. The findings were acknowledged and understood.          < end of copied text >    < from: MR Angio Head No Cont (18 @ 20:16) >  IMPRESSION:           Normal head/brain MRA.          ******PRELIMINARY REPORT******    ******PRELIMINARY REPORT******              POOJA GAMEZ M.D.;[facility] RADIOLOGIST                  < end of copied text >  < from: CT Head No Cont (17 @ 22:04) >  IMPRESSION:     Areas of hyperattenuation along the right central sulcus and left   temporal occipital region. Differential diagnostic considerations include   but are not limited to infection (i.e. meningitis), leptomeningeal   disease, and less likely hemorrhage given the multifocality without the   history of trauma in an atypical location for spontaneous hemorrhage.   Meningitis should be excluded on a clinical basis.    Dr. Joyner discussed the above findings with Dr. Crenshaw at 10:55 PM on   2017 with read back.                SABI JOYNER M.D., ATTENDING RADIOLOGIST  This document has been electronically signed. Dec 31 2017 10:59PM              < end of copied text >

## 2018-01-02 DIAGNOSIS — R93.0 ABNORMAL FINDINGS ON DIAGNOSTIC IMAGING OF SKULL AND HEAD, NOT ELSEWHERE CLASSIFIED: ICD-10-CM

## 2018-01-02 DIAGNOSIS — R41.0 DISORIENTATION, UNSPECIFIED: ICD-10-CM

## 2018-01-02 DIAGNOSIS — I34.0 NONRHEUMATIC MITRAL (VALVE) INSUFFICIENCY: ICD-10-CM

## 2018-01-02 DIAGNOSIS — R53.1 WEAKNESS: ICD-10-CM

## 2018-01-02 LAB
ALBUMIN SERPL ELPH-MCNC: 3.1 G/DL — LOW (ref 3.3–5)
ALP SERPL-CCNC: 93 U/L — SIGNIFICANT CHANGE UP (ref 40–120)
ALT FLD-CCNC: 61 U/L — SIGNIFICANT CHANGE UP (ref 12–78)
ANION GAP SERPL CALC-SCNC: 11 MMOL/L — SIGNIFICANT CHANGE UP (ref 5–17)
AST SERPL-CCNC: 36 U/L — SIGNIFICANT CHANGE UP (ref 15–37)
BILIRUB SERPL-MCNC: 0.6 MG/DL — SIGNIFICANT CHANGE UP (ref 0.2–1.2)
BUN SERPL-MCNC: 13 MG/DL — SIGNIFICANT CHANGE UP (ref 7–23)
CALCIUM SERPL-MCNC: 9.8 MG/DL — SIGNIFICANT CHANGE UP (ref 8.5–10.1)
CHLORIDE SERPL-SCNC: 105 MMOL/L — SIGNIFICANT CHANGE UP (ref 96–108)
CO2 SERPL-SCNC: 25 MMOL/L — SIGNIFICANT CHANGE UP (ref 22–31)
CREAT SERPL-MCNC: 0.97 MG/DL — SIGNIFICANT CHANGE UP (ref 0.5–1.3)
CRYPTOC AG CSF-ACNC: NEGATIVE — SIGNIFICANT CHANGE UP
ERYTHROCYTE [SEDIMENTATION RATE] IN BLOOD: 39 MM/HR — HIGH (ref 0–20)
GLUCOSE SERPL-MCNC: 141 MG/DL — HIGH (ref 70–99)
HCT VFR BLD CALC: 39.7 % — SIGNIFICANT CHANGE UP (ref 34.5–45)
HGB BLD-MCNC: 13 G/DL — SIGNIFICANT CHANGE UP (ref 11.5–15.5)
MCHC RBC-ENTMCNC: 29.1 PG — SIGNIFICANT CHANGE UP (ref 27–34)
MCHC RBC-ENTMCNC: 32.8 GM/DL — SIGNIFICANT CHANGE UP (ref 32–36)
MCV RBC AUTO: 88.9 FL — SIGNIFICANT CHANGE UP (ref 80–100)
PLATELET # BLD AUTO: 282 K/UL — SIGNIFICANT CHANGE UP (ref 150–400)
POTASSIUM SERPL-MCNC: 4.5 MMOL/L — SIGNIFICANT CHANGE UP (ref 3.5–5.3)
POTASSIUM SERPL-SCNC: 4.5 MMOL/L — SIGNIFICANT CHANGE UP (ref 3.5–5.3)
PROT SERPL-MCNC: 8.9 GM/DL — HIGH (ref 6–8.3)
RBC # BLD: 4.47 M/UL — SIGNIFICANT CHANGE UP (ref 3.8–5.2)
RBC # FLD: 13.1 % — SIGNIFICANT CHANGE UP (ref 11–15)
SODIUM SERPL-SCNC: 141 MMOL/L — SIGNIFICANT CHANGE UP (ref 135–145)
WBC # BLD: 21.5 K/UL — HIGH (ref 3.8–10.5)
WBC # FLD AUTO: 21.5 K/UL — HIGH (ref 3.8–10.5)

## 2018-01-02 PROCEDURE — 99233 SBSQ HOSP IP/OBS HIGH 50: CPT

## 2018-01-02 RX ORDER — VERAPAMIL HCL 240 MG
40 CAPSULE, EXTENDED RELEASE PELLETS 24 HR ORAL DAILY
Qty: 0 | Refills: 0 | Status: DISCONTINUED | OUTPATIENT
Start: 2018-01-02 | End: 2018-01-05

## 2018-01-02 RX ADMIN — Medication 112 MILLIGRAM(S): at 03:57

## 2018-01-02 RX ADMIN — Medication 112 MILLIGRAM(S): at 18:19

## 2018-01-02 RX ADMIN — Medication 102 MILLIGRAM(S): at 23:44

## 2018-01-02 RX ADMIN — CEFTRIAXONE 100 GRAM(S): 500 INJECTION, POWDER, FOR SOLUTION INTRAMUSCULAR; INTRAVENOUS at 21:27

## 2018-01-02 RX ADMIN — Medication 102 MILLIGRAM(S): at 12:39

## 2018-01-02 RX ADMIN — Medication 102 MILLIGRAM(S): at 00:28

## 2018-01-02 RX ADMIN — Medication 102 MILLIGRAM(S): at 18:19

## 2018-01-02 NOTE — PROGRESS NOTE ADULT - SUBJECTIVE AND OBJECTIVE BOX
Patient is a 52y old  Female who presented with a chief complaint of Weakness, lethargy, mild confusion, weight loss on 31 Dec 2017.  Notified by radiology on MR earlier today, pt had likely subarachnoid hemorrhage.    MR Head w/wo IV Cont (01.01.18 @ 20:21) >  Likely bilateral subarachnoid hemorrhage. Left occipital periventricular   enhancing focus of hemorrhage. Underlying lesion cannot be entirely   excluded in   this area.    Punctate acute to subacute right parietal periventricular infarction     Pt denied headache or neck pain, had sister who had cerebral aneurysm.  Discussed case with Dr Milena Fisher, she wanted CTA performed.  Pt had LP done earlier; Cerebrospinal Fluid Cell Count-1 (01.01.18 @ 01:15)    Total Nucleated Cell Count, CSF: 0 /uL    CSF Color: No Color    CSF Appearance: Clear    CSF Lymphocytes: 1 %    CSF Segmented Neutrophils: 0 %    Pt remained comfortable and stable with no acute neurological findings.  Results of CTA:    < from: CT Angio Head w/ IV Cont (01.01.18 @ 22:23) >  Noncontrast head CT: Stable high attenuation material in various sulci   which could represent hemorrhage or proteinaceous material. Stable small   focus of high attenuation in the left periatrial occipital lobe, presumed   hemorrhage based on MRI findings. Stable additional chronic findings as   above.    CTA head and neck:   No major vessel occlusion or hemodynamically significant stenosis. 2 tiny   foci of contrast within the left periatrial occipital lobe, in the region   of hemorrhage with associated enhancement on the recent MRI, which could   potentially represent pseudoaneurysms of either venous or arterial   etiology. This would be better characterized with conventional angiogram.    Discussed with radiologist who read CTA; pt would likely need conventional angiogram performed non urgently.  Message left for Dr Fisher.    Care Discussed with Consultants/Other Providers [x ] YES  [ ] NO

## 2018-01-02 NOTE — PROGRESS NOTE ADULT - SUBJECTIVE AND OBJECTIVE BOX
CC/F/U for: r/o intracerebral aneurysm    HPI:  52 woman with PMH of stroke x2 resulting in residual left sided weakness, mitral valve regurgitation presents with headache, weakness, mild confusion or "not feeling like myself" and poor appetite x 2 weeks; she also notes 20 pound weight loss over the past 2 weeks.  She denies fever, chills, photophobia, recent sick contacts, travel outside of [place], neck pain.  She says she take no medications and is otherwise healthy.    In the ED, patient's CT head read as likely infection, leptomeningial disease; less likely hemorrhage.  LP done in ED, results pending.  Tmax 102, CBC shows leukocytosis with left shift, troponin mildly elevated.  EKG NSR. (31 Dec 2017 23:43)        INTERVAL HPI/OVERNIGHT EVENTS:  Pt seen and examined at bedside, [facility] family.     Allergies/Intolerance: No Known Allergies      MEDICATIONS  (STANDING):  acyclovir IVPB 600 milliGRAM(s) IV Intermittent every 8 hours  cefTRIAXone   IVPB 1 Gram(s) IV Intermittent every 24 hours  dexamethasone  IVPB 10 milliGRAM(s) IV Intermittent every 6 hours    MEDICATIONS  (PRN):        ROS: as above; all other systems reviewed and wnl      PHYSICAL EXAMINATION:  Vital Signs Last 24 Hrs  T(C): 36 (2018 12:40), Max: 36.8 (2018 21:35)  T(F): 96.8 (2018 12:40), Max: 98.2 (2018 21:35)  HR: 88 (2018 12:40) (74 - 107)  BP: 138/74 (2018 12:40) (111/49 - 147/82)  RR: 18 (2018 12:40) (17 - 18)  SpO2: 100% (2018 12:40) (96% - 100%)      GENERAL: young female; NAD, well-groomed, well-developed  HEAD:  atraumatic, normocephalic  EYES: sclera anicteric  ENMT: mucous membranes moist  NECK: supple, No JVD  CHEST/LUNG: respirations unlabored; air entry symmetric; clear to auscultation bilaterally; no rales, rhonchi, or wheezing b/l  HEART: normal S1, S2  ABDOMEN: BS+, soft, ND, NT   EXTREMITIES:  pulses palpable; no clubbing, cyanosis, or edema b/l LEs  NEURO: awake, alert, interactive; LUE weakness (chronic) > LLE weakness  SKIN: no rashes or lesions      LABS:                        13.0   21.5  )-----------( 282      ( 2018 04:29 )             39.7         141  |  105  |  13  ----------------------------<  141<H>  4.5   |  25  |  0.97    Ca    9.8      2018 04:29  Mg     2.3         TPro  8.9<H>  /  Alb  3.1<L>  /  TBili  0.6  /  DBili  x   /  AST  36  /  ALT  61  /  AlkPhos  93        Urinalysis Basic - ( 31 Dec 2017 20:13 )    Color: Yellow / Appearance: Clear / S.010 / pH: x  Gluc: x / Ketone: Negative  / Bili: Negative / Urobili: Negative mg/dL   Blood: x / Protein: 15 mg/dL / Nitrite: Negative   Leuk Esterase: Negative / RBC: 0-2 /HPF / WBC x   Sq Epi: x / Non Sq Epi: Moderate / Bacteria: Few      ASSESSMENT AND PLAN:  Sharlene Patel adm  [id number]   52F, Mitral regurg, adm w/weakness, headache, confusion, decr appetitis, weight loss (20lbs in 2 weeks), fever, WBC 11.6, CT head c/f likely infection/leptomeningeal dz, s/p LP in ED w/NEGative results, influenza neg, CXR neg, MRI/MRA c/f R MCA aneurysm - for transfer to [facility] for angiogram as per Neuro/AHusain   -continue nimotop as per Neuro   -awaiting transfer CC/F/U for: r/o intracerebral aneurysm    HPI:  52 woman with PMH of stroke x2 resulting in residual left sided weakness, mitral valve regurgitation presents with headache, weakness, mild confusion or "not feeling like myself" and poor appetite x 2 weeks; she also notes 20 pound weight loss over the past 2 weeks.  She denies fever, chills, photophobia, recent sick contacts, travel outside of [place], neck pain.  She says she take no medications and is otherwise healthy.    In the ED, patient's CT head read as likely infection, leptomeningial disease; less likely hemorrhage.  LP done in ED, results pending.  Tmax 102, CBC shows leukocytosis with left shift, troponin mildly elevated.  EKG NSR. (31 Dec 2017 23:43)        INTERVAL HPI/OVERNIGHT EVENTS:  Pt seen and examined at bedside, [facility] family.     Allergies/Intolerance: No Known Allergies      MEDICATIONS  (STANDING):  acyclovir IVPB 600 milliGRAM(s) IV Intermittent every 8 hours  cefTRIAXone   IVPB 1 Gram(s) IV Intermittent every 24 hours  dexamethasone  IVPB 10 milliGRAM(s) IV Intermittent every 6 hours    MEDICATIONS  (PRN):        ROS: as above; all other systems reviewed and wnl      PHYSICAL EXAMINATION:  Vital Signs Last 24 Hrs  T(C): 36 (2018 12:40), Max: 36.8 (2018 21:35)  T(F): 96.8 (2018 12:40), Max: 98.2 (2018 21:35)  HR: 88 (2018 12:40) (74 - 107)  BP: 138/74 (2018 12:40) (111/49 - 147/82)  RR: 18 (2018 12:40) (17 - 18)  SpO2: 100% (2018 12:40) (96% - 100%)      GENERAL: young female; NAD, well-groomed, well-developed  HEAD:  atraumatic, normocephalic  EYES: sclera anicteric  ENMT: mucous membranes moist  NECK: supple, No JVD  CHEST/LUNG: respirations unlabored; air entry symmetric; clear to auscultation bilaterally; no rales, rhonchi, or wheezing b/l  HEART: normal S1, S2, 2/6SM  ABDOMEN: BS+, soft, ND, NT   EXTREMITIES:  pulses palpable; no clubbing, cyanosis, or edema b/l LEs  NEURO: awake, alert, interactive; LUE weakness (chronic) > LLE weakness  SKIN: no rashes or lesions      LABS:                        13.0   21.5  )-----------( 282      ( 2018 04:29 )             39.7     -02    141  |  105  |  13  ----------------------------<  141<H>  4.5   |  25  |  0.97    Ca    9.8      2018 04:29  Mg     2.3     12-31    TPro  8.9<H>  /  Alb  3.1<L>  /  TBili  0.6  /  DBili  x   /  AST  36  /  ALT  61  /  AlkPhos  93  01-02      Urinalysis Basic - ( 31 Dec 2017 20:13 )    Color: Yellow / Appearance: Clear / S.010 / pH: x  Gluc: x / Ketone: Negative  / Bili: Negative / Urobili: Negative mg/dL   Blood: x / Protein: 15 mg/dL / Nitrite: Negative   Leuk Esterase: Negative / RBC: 0-2 /HPF / WBC x   Sq Epi: x / Non Sq Epi: Moderate / Bacteria: Few

## 2018-01-02 NOTE — PROGRESS NOTE ADULT - ASSESSMENT
fever  ? hemorrhage   management per neurology   high wbc ?? decadron   plan to dc all antibiotics if stable and culture NEGATIVE am   will follow with you thanks

## 2018-01-02 NOTE — PROGRESS NOTE ADULT - ASSESSMENT
ASSESSMENT AND PLAN:  52F, Mitral regurg, hx prior CVA/L weakness, UE>LE; adm w/weakness, headache, confusion, decr appetite, weight loss (20lbs in 2 weeks), fever, WBC 11.6, CT head c/f likely infection/leptomeningeal dz, s/p LP in ED w/NEGative results, influenza neg, CXR neg, MRI/MRA c/f R MCA aneurysm - for transfer to [facility] for angiogram as per Neuro/AHusain    Acute neuro process - r/o aneurysm  -continue regimen of ceftriaxone, acyclovir, dexamethasone, verapimil as per Neuro/ID  -f/u cxs - neg so far  -awaiting transfer

## 2018-01-02 NOTE — PROGRESS NOTE ADULT - SUBJECTIVE AND OBJECTIVE BOX
Neurology Progress Note    No acute events. Patient had MRI brain which showed SAH and no evidence for meningitis. CTA head shows possible AVM, aneurysm    Neuro Exam: AOx3. Follows commands. No dysarthria. Left hemiparesis with spasticity    MRI brain wwo - Likely bilateral subarachnoid hemorrhage. Left occipital periventricular enhancing focus of hemorrhage. Underlying lesion cannot be entirely excluded in this area. Punctate acute to subacute right parietal periventricular infarction   MRA head- normal  Noncontrast head CT: Stable high attenuation material in various sulci   which could represent hemorrhage or proteinaceous material. Stable small   focus of high attenuation in the left periatrial occipital lobe, presumed   hemorrhage based on MRI findings. Stable additional chronic findings as   above.    CTA head and neck:   No major vessel occlusion or hemodynamically significant stenosis. 2 tiny   foci of contrast within the left periatrial occipital lobe, in the region   of hemorrhage with associated enhancement on the recent MRI, which could   potentially represent pseudoaneurysms of either venous or arterial   etiology. This would be better characterized with conventional angiogram.      A/P:  SAH (nontraumatic)  Possible AVM or cerebral aneurysm  Strokes (1993, 2003)    - She will need conventional angiogram. Transfer to [facility]  - Start Nimotop 60mg q 4 hrs for possible aneurysm  - I discussed with the patient at length the possibilities. Neurology Progress Note    No acute events. Patient had MRI brain which showed SAH and no evidence for meningitis. CTA head shows possible AVM, aneurysm    Neuro Exam: AOx3. Follows commands. No dysarthria. Left hemiparesis with spasticity    MRI brain wwo - Likely bilateral subarachnoid hemorrhage. Left occipital periventricular enhancing focus of hemorrhage. Underlying lesion cannot be entirely excluded in this area. Punctate acute to subacute right parietal periventricular infarction   MRA head- normal  Noncontrast head CT: Stable high attenuation material in various sulci   which could represent hemorrhage or proteinaceous material. Stable small   focus of high attenuation in the left periatrial occipital lobe, presumed   hemorrhage based on MRI findings. Stable additional chronic findings as   above.    CTA head and neck:   No major vessel occlusion or hemodynamically significant stenosis. 2 tiny   foci of contrast within the left periatrial occipital lobe, in the region   of hemorrhage with associated enhancement on the recent MRI, which could   potentially represent pseudoaneurysms of either venous or arterial   etiology. This would be better characterized with conventional angiogram.      A/P:  SAH (nontraumatic)  Possible AVM or cerebral aneurysm  Strokes (1993, 2003)    - She will need conventional angiogram. Transfer to [place]  - Start Nimotop 60mg q 4 hrs for possible aneurysm  - I discussed with the patient at length the possibilities.     ADDENDUM:  I discussed the case with Dr. Wright (Stroke attending) and Dr. Pastor (Neurosurgery attending) at [place] and Dr. Pastor did not think a transfer was necessary. He told me that she can follow with him in his office as outpatient for conventional angiogram. 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 We will start verapamil 80mg daily. Obtain vasculitis work up. D/C home tomorrow if repeat CT head unremarkable.

## 2018-01-02 NOTE — PROGRESS NOTE ADULT - SUBJECTIVE AND OBJECTIVE BOX
HPI:  52 woman with PMH of stroke x2 resulting in residual left sided weakness, mitral valve regurgitation presents with headache, weakness, mild confusion or "not feeling like myself" and poor appetite x 2 weeks; she also notes 20 pound weight loss over the past 2 weeks.  She denies fever, chills, photophobia, recent sick contacts, travel outside of [place], neck pain.  She says she take no medications and is otherwise healthy.  In the ED, patient's CT head read as likely infection, leptomeningial disease; less likely hemorrhage.  LP done in ED, results pending.  Tmax 102, CBC shows leukocytosis with left shift, troponin mildly elevated.  EKG NSR. (31 Dec 2017 23:43)  pts work up not really consistent with meningitis   neuro notes seen     Allergies    No Known Allergies    Intolerances        MEDICATIONS  (STANDING):  acyclovir IVPB 600 milliGRAM(s) IV Intermittent every 8 hours  cefTRIAXone   IVPB 1 Gram(s) IV Intermittent every 24 hours  dexamethasone  IVPB 10 milliGRAM(s) IV Intermittent every 6 hours  verapamil 40 milliGRAM(s) Oral daily    MEDICATIONS  (PRN):      REVIEW OF SYSTEMS:    feels fine   no cough short of breath nausea vomiting diarrhea     VITAL SIGNS:  T(C): 36 (18 @ 12:40), Max: 36.8 (18 @ 21:35)  T(F): 96.8 (18 @ 12:40), Max: 98.2 (18 @ 21:35)  HR: 88 (18 @ 12:40) (78 - 107)  BP: 138/74 (18 @ 12:40) (121/61 - 147/82)  RR: 18 (18 @ 12:40) (18 - 18)  SpO2: 100% (18 @ 12:40) (98% - 100%)  Wt(kg): --    PHYSICAL EXAM:    GENERAL: not in any distress  HEENT: Neck is supple, normocephalic, atraumatic   CHEST/LUNG: Clear to auscultation bilaterally; No rales, rhonchi, wheezing  HEART: Regular rate and rhythm; 4/6 systolic  murmurs,  no  rubs, or gallops  ABDOMEN: Soft, Nontender, Nondistended; Bowel sounds present, no rebound   EXTREMITIES:  2+ Peripheral Pulses, No clubbing, cyanosis, or edema  no change in status   SKIN: No rashes or lesions  BACK: no pressor sore   NERVOUS SYSTEM:  Alert & Oriented X3, Good concentration  PSYCH: normal affect     LABS:                         13.0   21.5  )-----------( 282      ( 2018 04:29 )             39.7         141  |  105  |  13  ----------------------------<  141<H>  4.5   |  25  |  0.97    Ca    9.8      2018 04:29  Mg     2.3         TPro  8.9<H>  /  Alb  3.1<L>  /  TBili  0.6  /  DBili  x   /  AST  36  /  ALT  61  /  AlkPhos  93      LIVER FUNCTIONS - ( 2018 04:29 )  Alb: 3.1 g/dL / Pro: 8.9 gm/dL / ALK PHOS: 93 U/L / ALT: 61 U/L / AST: 36 U/L / GGT: x             Urinalysis Basic - ( 31 Dec 2017 20:13 )    Color: Yellow / Appearance: Clear / S.010 / pH: x  Gluc: x / Ketone: Negative  / Bili: Negative / Urobili: Negative mg/dL   Blood: x / Protein: 15 mg/dL / Nitrite: Negative   Leuk Esterase: Negative / RBC: 0-2 /HPF / WBC x   Sq Epi: x / Non Sq Epi: Moderate / Bacteria: Few        CARDIAC MARKERS ( 2018 07:52 )  .052 ng/mL / x     / 74 U/L / x     / <0.5 ng/mL  CARDIAC MARKERS ( 31 Dec 2017 19:15 )  .053 ng/mL / x     / x     / x     / <0.5 ng/mL                    Culture Results:   No growth to date. ( @ 13:09)  Culture Results:   No growth to date. ( @ 13:09)  Culture Results:   No growth ( @ 03:33)  Culture Results:   No growth ( @ 23:11)                Radiology:  < from: CT Angio Head w/ IV Cont (18 @ 22:23) >  IMPRESSION:  Noncontrast head CT: Stable high attenuation material in various sulci   which could represent hemorrhage or proteinaceous material. Stable small   focus of high attenuation in the left periatrial occipital lobe, presumed   hemorrhage based on MRI findings. Stable additional chronic findings as   above.    CTA head and neck:   No major vessel occlusion or hemodynamically significant stenosis. 2 tiny   foci of contrast within the left periatrial occipital lobe, in the region   of hemorrhage with associated enhancement on the recent MRI, which could   potentially represent pseudoaneurysms of either venous or arterial   etiology. This would be better characterized with conventional angiogram.      I discussed the findings with Dr. Velasco at 1:25 AM on 2018 with   read back verification.                  < end of copied text >

## 2018-01-03 LAB
ANION GAP SERPL CALC-SCNC: 10 MMOL/L — SIGNIFICANT CHANGE UP (ref 5–17)
ANTI-RIBONUCLEAR PROTEIN: 0.4 AI — SIGNIFICANT CHANGE UP
AUTO DIFF PNL BLD: NEGATIVE — SIGNIFICANT CHANGE UP
B BURGDOR DNA SPEC QL NAA+PROBE: NEGATIVE — SIGNIFICANT CHANGE UP
BASOPHILS # BLD AUTO: 0 K/UL — SIGNIFICANT CHANGE UP (ref 0–0.2)
BASOPHILS NFR BLD AUTO: 0.1 % — SIGNIFICANT CHANGE UP (ref 0–2)
BUN SERPL-MCNC: 19 MG/DL — SIGNIFICANT CHANGE UP (ref 7–23)
C-ANCA SER-ACNC: NEGATIVE — SIGNIFICANT CHANGE UP
C3 SERPL-MCNC: 167 MG/DL — SIGNIFICANT CHANGE UP (ref 80–180)
C4 SERPL-MCNC: 48 MG/DL — HIGH (ref 10–45)
CALCIUM SERPL-MCNC: 9.5 MG/DL — SIGNIFICANT CHANGE UP (ref 8.5–10.1)
CARDIOLIPIN AB SER-ACNC: NEGATIVE — SIGNIFICANT CHANGE UP
CHLORIDE SERPL-SCNC: 104 MMOL/L — SIGNIFICANT CHANGE UP (ref 96–108)
CK MB BLD-MCNC: 2.3 % — SIGNIFICANT CHANGE UP (ref 0–3.5)
CK MB CFR SERPL CALC: 1.6 NG/ML — SIGNIFICANT CHANGE UP (ref 0.5–3.6)
CK SERPL-CCNC: 69 U/L — SIGNIFICANT CHANGE UP (ref 26–192)
CO2 SERPL-SCNC: 26 MMOL/L — SIGNIFICANT CHANGE UP (ref 22–31)
CREAT SERPL-MCNC: 1.01 MG/DL — SIGNIFICANT CHANGE UP (ref 0.5–1.3)
CRP SERPL-MCNC: 1.8 MG/DL — HIGH (ref 0–0.4)
ENA SM AB FLD QL: <0.2 AI — SIGNIFICANT CHANGE UP
EOSINOPHIL # BLD AUTO: 0 K/UL — SIGNIFICANT CHANGE UP (ref 0–0.5)
EOSINOPHIL NFR BLD AUTO: 0 % — SIGNIFICANT CHANGE UP (ref 0–6)
FACT VIII ACT/NOR PPP: 346 % — HIGH (ref 60–125)
GLUCOSE SERPL-MCNC: 143 MG/DL — HIGH (ref 70–99)
HCT VFR BLD CALC: 38.6 % — SIGNIFICANT CHANGE UP (ref 34.5–45)
HGB BLD-MCNC: 12.4 G/DL — SIGNIFICANT CHANGE UP (ref 11.5–15.5)
LYMPHOCYTES # BLD AUTO: 1 K/UL — SIGNIFICANT CHANGE UP (ref 1–3.3)
LYMPHOCYTES # BLD AUTO: 3.3 % — LOW (ref 13–44)
MAGNESIUM SERPL-MCNC: 2.4 MG/DL — SIGNIFICANT CHANGE UP (ref 1.6–2.6)
MCHC RBC-ENTMCNC: 28.7 PG — SIGNIFICANT CHANGE UP (ref 27–34)
MCHC RBC-ENTMCNC: 32.2 GM/DL — SIGNIFICANT CHANGE UP (ref 32–36)
MCV RBC AUTO: 89 FL — SIGNIFICANT CHANGE UP (ref 80–100)
MONOCYTES # BLD AUTO: 0.6 K/UL — SIGNIFICANT CHANGE UP (ref 0–0.9)
MONOCYTES NFR BLD AUTO: 1.9 % — LOW (ref 2–14)
NEUTROPHILS # BLD AUTO: 28.2 K/UL — HIGH (ref 1.8–7.4)
NEUTROPHILS NFR BLD AUTO: 94.7 % — HIGH (ref 43–77)
P-ANCA SER-ACNC: NEGATIVE — SIGNIFICANT CHANGE UP
PHOSPHATE SERPL-MCNC: 4.2 MG/DL — SIGNIFICANT CHANGE UP (ref 2.5–4.5)
PLATELET # BLD AUTO: 310 K/UL — SIGNIFICANT CHANGE UP (ref 150–400)
POTASSIUM SERPL-MCNC: 5 MMOL/L — SIGNIFICANT CHANGE UP (ref 3.5–5.3)
POTASSIUM SERPL-SCNC: 5 MMOL/L — SIGNIFICANT CHANGE UP (ref 3.5–5.3)
RBC # BLD: 4.33 M/UL — SIGNIFICANT CHANGE UP (ref 3.8–5.2)
RBC # FLD: 13.5 % — SIGNIFICANT CHANGE UP (ref 11–15)
SODIUM SERPL-SCNC: 140 MMOL/L — SIGNIFICANT CHANGE UP (ref 135–145)
TROPONIN I SERPL-MCNC: 0.08 NG/ML — HIGH (ref 0.01–0.04)
TROPONIN I SERPL-MCNC: 0.09 NG/ML — HIGH (ref 0.01–0.04)
VDRL CSF-TITR: NEGATIVE — SIGNIFICANT CHANGE UP
WBC # BLD: 29.7 K/UL — HIGH (ref 3.8–10.5)
WBC # FLD AUTO: 29.7 K/UL — HIGH (ref 3.8–10.5)

## 2018-01-03 PROCEDURE — 70450 CT HEAD/BRAIN W/O DYE: CPT | Mod: 26

## 2018-01-03 PROCEDURE — 99233 SBSQ HOSP IP/OBS HIGH 50: CPT

## 2018-01-03 PROCEDURE — 93010 ELECTROCARDIOGRAM REPORT: CPT

## 2018-01-03 RX ADMIN — Medication 40 MILLIGRAM(S): at 11:59

## 2018-01-03 RX ADMIN — Medication 112 MILLIGRAM(S): at 16:59

## 2018-01-03 RX ADMIN — Medication 112 MILLIGRAM(S): at 03:48

## 2018-01-03 RX ADMIN — Medication 102 MILLIGRAM(S): at 05:25

## 2018-01-03 NOTE — PROGRESS NOTE ADULT - SUBJECTIVE AND OBJECTIVE BOX
CC/F/U for: weakness, r/o intracerebral aneurysm    HPI:  52 woman with PMH of stroke x2 resulting in residual left sided weakness, mitral valve regurgitation presents with headache, weakness, mild confusion or "not feeling like myself" and poor appetite x 2 weeks; she also notes 20 pound weight loss over the past 2 weeks.  She denies fever, chills, photophobia, recent sick contacts, travel outside of [place], neck pain.  She says she take no medications and is otherwise healthy.    In the ED, patient's CT head read as likely infection, leptomeningial disease; less likely hemorrhage.  LP done in ED, results pending.  Tmax 102, CBC shows leukocytosis with left shift, troponin mildly elevated.  EKG NSR. (31 Dec 2017 23:43)        INTERVAL HPI/OVERNIGHT EVENTS:  Pt seen and examined at bedside  -repeat CT head today       Allergies/Intolerance: No Known Allergies      MEDICATIONS  (STANDING):  acyclovir IVPB 600 milliGRAM(s) IV Intermittent every 8 hours  cefTRIAXone   IVPB 1 Gram(s) IV Intermittent every 24 hours  verapamil 40 milliGRAM(s) Oral daily    MEDICATIONS  (PRN):        ROS: as above; all other systems reviewed and wnl      PHYSICAL EXAMINATION:  Vital Signs Last 24 Hrs  T(C): 36.2 (03 Jan 2018 11:52), Max: 36.6 (02 Jan 2018 18:25)  T(F): 97.2 (03 Jan 2018 11:52), Max: 97.9 (02 Jan 2018 18:25)  HR: 74 (03 Jan 2018 11:52) (73 - 89)  BP: 117/60 (03 Jan 2018 11:52) (103/51 - 117/71)  RR: 16 (03 Jan 2018 11:52) (16 - 18)  SpO2: 98% (03 Jan 2018 11:52) (98% - 100%)    GENERAL: young female; NAD, well-groomed, well-developed  HEAD:  atraumatic, normocephalic  EYES: sclera anicteric  ENMT: mucous membranes moist  NECK: supple, No JVD  CHEST/LUNG: respirations unlabored; air entry symmetric; clear to auscultation bilaterally; no rales, rhonchi, or wheezing b/l  HEART: normal S1, S2, 2/6SM  ABDOMEN: BS+, soft, ND, NT   EXTREMITIES:  pulses palpable; no clubbing, cyanosis, or edema b/l LEs  NEURO: awake, alert, interactive; LUE weakness (chronic) > LLE weakness  SKIN: no rashes or lesions      LABS:                        12.4   29.7  )-----------( 310      ( 03 Jan 2018 03:27 )             38.6     01-03    140  |  104  |  19  ----------------------------<  143<H>  5.0   |  26  |  1.01    Ca    9.5      03 Jan 2018 03:27  Phos  4.2     01-03  Mg     2.4     01-03    TPro  8.9<H>  /  Alb  3.1<L>  /  TBili  0.6  /  DBili  x   /  AST  36  /  ALT  61  /  AlkPhos  93  01-02

## 2018-01-03 NOTE — PROGRESS NOTE ADULT - SUBJECTIVE AND OBJECTIVE BOX
52F, Mitral regurg, hx prior CVA/L weakness, UE>LE; adm w/weakness, headache, confusion, decr appetite, weight loss (20lbs in 2 weeks), fever, WBC 11.6, CT head c/f likely infection/leptomeningeal dz, s/p LP in ED w/NEGative results, influenza neg, CXR neg, MRI/MRA c/f R MCA aneurysm - initially for transfer to [facility] for angiogram as per Neuro/usain, however Neurosurgery/Stroke MD at [facility] did not feel transfer was necessary, and pt could f/u as o/p - for repeat CT head to determine next step in mgmt  neuro notes seen     No Known Allergies    Intolerances        MEDICATIONS  (STANDING):  verapamil 40 milliGRAM(s) Oral daily    MEDICATIONS  (PRN):      REVIEW OF SYSTEMS:    back to baseline   no fevers   cough short of breath nausea vomiting diarrhea     VITAL SIGNS:  T(C): 36.2 (01-03-18 @ 17:54), Max: 36.4 (01-02-18 @ 23:51)  T(F): 97.2 (01-03-18 @ 17:54), Max: 97.6 (01-02-18 @ 23:51)  HR: 75 (01-03-18 @ 17:54) (74 - 89)  BP: 104/53 (01-03-18 @ 17:54) (104/53 - 117/71)  RR: 18 (01-03-18 @ 17:54) (16 - 18)  SpO2: 99% (01-03-18 @ 17:54) (98% - 99%)  Wt(kg): --    PHYSICAL EXAM:    GENERAL: not in any distress  HEENT: Neck is supple, normocephalic, atraumatic   CHEST/LUNG: Clear to auscultation bilaterally; No rales, rhonchi, wheezing  HEART: Regular rate and rhythm; No murmurs, rubs, or gallops  ABDOMEN: Soft, Nontender, Nondistended; Bowel sounds present, no rebound   EXTREMITIES:  2+ Peripheral Pulses, No clubbing, cyanosis, or edema  no change in status of neuro deficit   SKIN: No rashes or lesions  BACK: no pressor sore   NERVOUS SYSTEM:  Alert & Oriented X3,   LABS:                         12.4   29.7  )-----------( 310      ( 03 Jan 2018 03:27 )             38.6     01-03    140  |  104  |  19  ----------------------------<  143<H>  5.0   |  26  |  1.01    Ca    9.5      03 Jan 2018 03:27  Phos  4.2     01-03  Mg     2.4     01-03    TPro  8.9<H>  /  Alb  3.1<L>  /  TBili  0.6  /  DBili  x   /  AST  36  /  ALT  61  /  AlkPhos  93  01-02    LIVER FUNCTIONS - ( 02 Jan 2018 04:29 )  Alb: 3.1 g/dL / Pro: 8.9 gm/dL / ALK PHOS: 93 U/L / ALT: 61 U/L / AST: 36 U/L / GGT: x                 CARDIAC MARKERS ( 03 Jan 2018 09:52 )  .082 ng/mL / x     / x     / x     / x      CARDIAC MARKERS ( 03 Jan 2018 03:27 )  .094 ng/mL / x     / 69 U/L / x     / 1.6 ng/mL          Sedimentation Rate, Erythrocyte: 39 mm/hr (01-02 @ 18:09)            Culture Results:   No growth to date. (01-01 @ 13:09)  Culture Results:   No growth to date. (01-01 @ 13:09)  Culture Results:   No growth (01-01 @ 03:33)  Culture Results:   No growth (12-31 @ 23:11)                Radiology:      < from: CT Head No Cont (01.03.18 @ 12:46) >  IMPRESSION:    1)  persistent interstitial abnormalities suggesting subacute   subarachnoid hemorrhage.  2)  scattered chronic ischemic changes inclusive of an old right MCA   infarct.    Follow-up MR imaging recommended for further assessment with and without   gadolinium.                  NASIM JONES M.D., ATTENDING RADIOLOGIST  This document has been electronically signed. Demetris  3 2018  2:17PM          < end of copied text >

## 2018-01-03 NOTE — CHART NOTE - NSCHARTNOTEFT_GEN_A_CORE
Upon Nutritional Assessment by the Registered Dietitian your patient was determined to meet criteria / has evidence of the following diagnosis/diagnoses:          [ ]  Mild Protein Calorie Malnutrition        [ ]  Moderate Protein Calorie Malnutrition        [X ] Severe Protein Calorie Malnutrition        [ ] Unspecified Protein Calorie Malnutrition        [ ] Underweight / BMI <19        [ ] Morbid Obesity / BMI > 40      Findings as based on:  •  Comprehensive nutrition assessment and consultation  •  Calorie counts (nutrient intake analysis)  •  Food acceptance and intake status from observations by staff  •  Follow up  •  Patient education  •  Intervention secondary to interdisciplinary rounds  •   concerns      Treatment:    The following diet has been recommended:  Continue Regular diet; add Ensure Enlive x 2/day (provides 700 kcal, 40 g protein) for additional nutrition support.    PLEASE NOTE: this dx of malnutrition is based on wt loss + decreased appetite as reported by pt per [facility] Clinical Malnutrition Characteristics Guidelines       PROVIDER Section:     By signing this assessment you are acknowledging and agree with the diagnosis/diagnoses assigned by the Registered Dietitian    Comments:

## 2018-01-03 NOTE — PROGRESS NOTE ADULT - SUBJECTIVE AND OBJECTIVE BOX
Neurology Progress Note    No acute events.     Neuro Exam: AOx3. Follows commands. No dysarthria. Left hemiparesis with spasticity    MRI brain wwo - Likely bilateral subarachnoid hemorrhage. Left occipital periventricular enhancing focus of hemorrhage. Underlying lesion cannot be entirely excluded in this area. Punctate acute to subacute right parietal periventricular infarction   MRA head- normal  Noncontrast head CT: Stable high attenuation material in various sulci   which could represent hemorrhage or proteinaceous material. Stable small   focus of high attenuation in the left periatrial occipital lobe, presumed   hemorrhage based on MRI findings. Stable additional chronic findings as   above.    CTA head and neck:   No major vessel occlusion or hemodynamically significant stenosis. 2 tiny   foci of contrast within the left periatrial occipital lobe, in the region   of hemorrhage with associated enhancement on the recent MRI, which could   potentially represent pseudoaneurysms of either venous or arterial   etiology. This would be better characterized with conventional angiogram.    A/P:  SAH (nontraumatic)  Strokes (1993, 2003)    - D/C dexamethasone. I doubt infectious etiology. ID to follow.  - continue verapamil 40mg daily  - CT head pending for today  - Yesterday, I discussed the case with Dr. Wright (Stroke attending) and Dr. Pastor (Neurosurgery attending) at [place] and Dr. Pastor did not think a transfer was necessary. He told me that she can follow with him in his office as outpatient for conventional angiogram. 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 Vasculitis work up. D/C home today if repeat CT head unremarkable.

## 2018-01-03 NOTE — PROGRESS NOTE ADULT - ASSESSMENT
ASSESSMENT AND PLAN:  52F, Mitral regurg, hx prior CVA/L weakness, UE>LE; adm w/weakness, headache, confusion, decr appetite, weight loss (20lbs in 2 weeks), fever, WBC 11.6, CT head c/f likely infection/leptomeningeal dz, s/p LP in ED w/NEGative results, influenza neg, CXR neg, MRI/MRA c/f R MCA aneurysm - initially for transfer to [facility] for angiogram as per Neuro/usain, however Neurosurgery/Stroke MD at [facility] did not feel transfer was necessary, and pt could f/u as o/p - for repeat CT head to determine next step in mgmt    Acute neuro process - r/o aneurysm vs other intracerebral process  -continue regimen of ceftriaxone, acyclovir, dexamethasone, verapimil as per Neuro/ID  -f/u cxs - neg so far  -further recomms as per Neuro ASSESSMENT AND PLAN:  52F, Mitral regurg, hx prior CVA/L weakness, UE>LE; adm w/weakness, headache, confusion, decr appetite, weight loss (20lbs in 2 weeks), fever, WBC 11.6, CT head c/f likely infection/leptomeningeal dz, s/p LP in ED w/NEGative results, influenza neg, CXR neg, MRI/MRA c/f R MCA aneurysm - initially for transfer to [facility] for angiogram as per Neuro/[place], however Neurosurgery/Stroke MD at [facility] did not feel transfer was necessary, and pt could f/u as o/p - for repeat CT head to determine next step in mgmt    Acute neuro process - r/o aneurysm vs other intracerebral process  -steroids d/c'd, continues on ceftriaxone, acyclovir, verapimil as per Neuro/ID - d/w ID re d/c abxs as well  -f/u cxs - neg so far  -further recomms as per Neuro

## 2018-01-03 NOTE — DIETITIAN INITIAL EVALUATION ADULT. - SIGNS/SYMPTOMS
Wt loss of 9.9% x 2 weeks; < 50% nutritional needs >5 days, physical signs of malnutrition as noted Wt loss of 9.9% x 2 weeks; < 50% nutritional needs >5 days

## 2018-01-03 NOTE — DIETITIAN INITIAL EVALUATION ADULT. - ENERGY NEEDS
Height (cm): 170.18 (01-02)  Weight (kg): 58.3 (01-02)  BMI (kg/m2): 20.1 (01-02)  IBW:   61.4 kg +/- 10%    % IBW:   95%      UBW:   64.5 kg    %UBW; 90%

## 2018-01-03 NOTE — DIETITIAN INITIAL EVALUATION ADULT. - PERTINENT LABORATORY DATA
01-03 Na140 mmol/L Glu 143 mg/dL<H> K+ 5.0 mmol/L Cr  1.01 mg/dL BUN 19 mg/dL Phos 4.2 mg/dL Alb n/a   PAB n/a

## 2018-01-03 NOTE — DIETITIAN INITIAL EVALUATION ADULT. - OTHER INFO
Pt seen for consult - unintentional wt loss. 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 Pt reports wt loss as noted. Denies any N/V/C/D or chew/swallowing difficulty at this time. Agreeable to nutrition supplement. Reports appetite now returning to normal.

## 2018-01-03 NOTE — DIETITIAN INITIAL EVALUATION ADULT. - PHYSICAL APPEARANCE
underweight/BMI = 20.1; no edema noted; BMI = 20.1; no edema noted; no physical signs of malnutrition noted/underweight

## 2018-01-03 NOTE — PROGRESS NOTE ADULT - ASSESSMENT
fever  ? hemorrhage   management per neurology   high wbc ?? decadron   plan to dc all antibiotics   high wbc from ?? decadron   has been dcd noted  will follow with you thanks

## 2018-01-04 DIAGNOSIS — I60.9 NONTRAUMATIC SUBARACHNOID HEMORRHAGE, UNSPECIFIED: ICD-10-CM

## 2018-01-04 DIAGNOSIS — D72.829 ELEVATED WHITE BLOOD CELL COUNT, UNSPECIFIED: ICD-10-CM

## 2018-01-04 LAB
CULTURE RESULTS: NO GROWTH — SIGNIFICANT CHANGE UP
GRAM STN FLD: SIGNIFICANT CHANGE UP
SPECIMEN SOURCE: SIGNIFICANT CHANGE UP

## 2018-01-04 PROCEDURE — 99233 SBSQ HOSP IP/OBS HIGH 50: CPT

## 2018-01-04 PROCEDURE — 93306 TTE W/DOPPLER COMPLETE: CPT | Mod: 26

## 2018-01-04 RX ADMIN — Medication 40 MILLIGRAM(S): at 05:28

## 2018-01-04 NOTE — PHYSICAL THERAPY INITIAL EVALUATION ADULT - PERTINENT HX OF CURRENT PROBLEM, REHAB EVAL
Pt is a 53yo weakness, lethargy, AMS, & weight loss. CT head: persistent intestinal abnormalities suggesting subacute SAH & scattered chronic ischemic changes inclusive of an old right MCA infarct...cont below...

## 2018-01-04 NOTE — PROGRESS NOTE ADULT - SUBJECTIVE AND OBJECTIVE BOX
Neurology Progress Note    No acute events. She feels great. Her appetite is back to normal.     Neuro Exam: AOx3. Follows commands. No dysarthria. Left hemiparesis with spasticity. Walking without trouble.     MRI brain wwo - Likely bilateral subarachnoid hemorrhage. Left occipital periventricular enhancing focus of hemorrhage. Underlying lesion cannot be entirely excluded in this area. Punctate acute to subacute right parietal periventricular infarction   MRA head- normal  Noncontrast head CT: Stable high attenuation material in various sulci   which could represent hemorrhage or proteinaceous material. Stable small   focus of high attenuation in the left periatrial occipital lobe, presumed   hemorrhage based on MRI findings. Stable additional chronic findings as   above.    CTA head and neck:   No major vessel occlusion or hemodynamically significant stenosis. 2 tiny   foci of contrast within the left periatrial occipital lobe, in the region   of hemorrhage with associated enhancement on the recent MRI, which could   potentially represent pseudoaneurysms of either venous or arterial   etiology. This would be better characterized with conventional angiogram.  Repeat CT head (1/3/18)- unchanged    A/P:  SAH (nontraumatic)  Strokes (1993, 2003)    - dexamethasone and antibiotics were discontinued. I doubt infectious etiology. ID to follow.  - continue verapamil 40mg daily  - repeat CT head is unchanged  - OK to discharge and follow up with Dr. Pastor (neurosurgery) for further work up and management  - Patient is back to baseline. Likelihood of cerebral aneurysm low given location and her symptoms.   - I discussed the case with Dr. Wright (Stroke attending) and Dr. Pastor (Neurosurgery attending) at [place] and Dr. Pastor did not think a transfer was necessary. He told me that she can follow with him in his office as outpatient for conventional angiogram. 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 Vasculitis work up. D/C home today if repeat CT head unremarkable.

## 2018-01-04 NOTE — PROGRESS NOTE ADULT - SUBJECTIVE AND OBJECTIVE BOX
CC/F/U for: weakness, ICH    HPI:  52 woman with PMH of stroke x2 resulting in residual left sided weakness, mitral valve regurgitation presents with headache, weakness, mild confusion or "not feeling like myself" and poor appetite x 2 weeks; she also notes 20 pound weight loss over the past 2 weeks.  She denies fever, chills, photophobia, recent sick contacts, travel outside of [place], neck pain.  She says she take no medications and is otherwise healthy.    In the ED, patient's CT head read as likely infection, leptomeningial disease; less likely hemorrhage.  LP done in ED, results pending.  Tmax 102, CBC shows leukocytosis with left shift, troponin mildly elevated.  EKG NSR. (31 Dec 2017 23:43)        INTERVAL HPI/OVERNIGHT EVENTS:  Pt seen and examined at bedside  -trops up to 0.09  -denies chest pain, sob, headache      Allergies/Intolerance: No Known Allergies      MEDICATIONS  (STANDING):  verapamil 40 milliGRAM(s) Oral daily    MEDICATIONS  (PRN):        ROS: as above; all other systems reviewed and wnl      PHYSICAL EXAMINATION:  Vital Signs Last 24 Hrs  T(C): 36.6 (04 Jan 2018 17:12), Max: 36.6 (04 Jan 2018 17:12)  T(F): 97.9 (04 Jan 2018 17:12), Max: 97.9 (04 Jan 2018 17:12)  HR: 79 (04 Jan 2018 17:12) (75 - 84)  BP: 108/54 (04 Jan 2018 17:12) (104/53 - 123/65)  RR: 18 (04 Jan 2018 17:12) (18 - 18)  SpO2: 100% (04 Jan 2018 17:12) (98% - 100%)    GENERAL: young female; NAD, well-groomed, well-developed  HEAD:  atraumatic, normocephalic  EYES: sclera anicteric  ENMT: mucous membranes moist  NECK: supple, No JVD  CHEST/LUNG: respirations unlabored; air entry symmetric; clear to auscultation bilaterally; no rales, rhonchi, or wheezing b/l  HEART: normal S1, S2, 2/6SM  ABDOMEN: BS+, soft, ND, NT   EXTREMITIES:  pulses palpable; no clubbing, cyanosis, or edema b/l LEs  NEURO: awake, alert, interactive; LUE weakness (chronic) > LLE weakness  SKIN: no rashes or lesions      LABS:                        12.4   29.7  )-----------( 310      ( 03 Jan 2018 03:27 )             38.6     01-03    140  |  104  |  19  ----------------------------<  143<H>  5.0   |  26  |  1.01    Ca    9.5      03 Jan 2018 03:27  Phos  4.2     01-03  Mg     2.4     01-03

## 2018-01-04 NOTE — PROGRESS NOTE ADULT - ASSESSMENT
52F, Mitral regurg, hx prior CVA/L weakness, UE>LE; adm w/weakness, headache, confusion, decr appetite, weight loss (20lbs in 2 weeks), fever, WBC 11.6, CT head c/f likely infection/leptomeningeal dz, s/p LP in ED w/NEGative results, influenza neg, CXR neg, MRI/MRA c/f R MCA aneurysm - [facility] however Neurosurgery/Stroke MD at [facility] did not feel transfer was necessary, and pt could f/u as o/p - repeat CT head suggest subacute SAH - recomms repeat MRI - also elev trops     Acute neuro process - ICH on imaging - c/f SAH vs vasculitis vs aneurysm - no e/o acute infection, cxs neg - abxs d/c'd, steroids d/c'd - last CTH 1/3/18 suggested findings c/f AVELINO and recomm repeat MRI brain w/w/o contrast  - order placed, f/u results; continue verapimil     leukocytosis - likely stress-induced in setting of steroids - trend w/daily labs    elevated trops 0.053 - continuing to increase - peak up to 0.094 - r/o cardiac ischemia   -telemetry monitoring   -TTE pending   -no ASA for now given ICH   -cardiology consult - [facility] to see     dvt prophy - early ambulation, oob to chair

## 2018-01-04 NOTE — CONSULT NOTE ADULT - SUBJECTIVE AND OBJECTIVE BOX
HPI:  HPI:  52 woman with PMH of stroke x2 resulting in residual left sided weakness, mitral valve regurgitation presents with headache, weakness, mild confusion or "not feeling like myself" and poor appetite x 2 weeks; she also notes 20 pound weight loss over the past 2 weeks.  She denies fever, chills, photophobia, recent sick contacts, travel outside of [place], neck pain.  She says she take no medications and is otherwise healthy.    In the ED, patient's CT head read as likely infection, leptomeningial disease; less likely hemorrhage.  LP done in ED, results pending.  Tmax 102, CBC shows leukocytosis with left shift, troponin mildly elevated.  EKG NSR. (31 Dec 2017 23:43)      Chief Complaint:  Patient is a 52y old  Female who presents with a chief complaint of Weakness, lethargy, mild confusion, weight loss (31 Dec 2017 23:43)      Review of Systems:    see above         Social History/Family History  SOCHX:   tobacco,  -  alcohol    FMHX: FA/MO  - contributory       Discussed with:  PMD, Family    Physical Exam:    Vital Signs:  Vital Signs Last 24 Hrs  T(C): 36.6 (2018 17:12), Max: 36.6 (2018 17:12)  T(F): 97.9 (2018 17:12), Max: 97.9 (2018 17:12)  HR: 79 (2018 17:12) (75 - 84)  BP: 108/54 (2018 17:12) (108/54 - 123/65)  BP(mean): --  RR: 18 (2018 17:12) (18 - 18)  SpO2: 100% (2018 17:12) (98% - 100%)  Daily     Daily Weight in k.1 (2018 05:17)  I&O's Summary    2018 07:01  -  2018 07:00  --------------------------------------------------------  IN: 420 mL / OUT: 0 mL / NET: 420 mL          Chest:  Full & symmetric excursion, no increased effort, breath sounds clear  Cardiovascular:  Regular rhythm, S1, S2, no murmur/rub/S3/S4, no carotid/femoral/abdominal bruit, radial/pedal pulses 2+, no edema  Abdomen:  Soft, non-tender, non-distended, normoactive bowel sounds, no HSM      Laboratory:                          12.4   29.7  )-----------( 310      ( 2018 03:27 )             38.6     01-03    140  |  104  |  19  ----------------------------<  143<H>  5.0   |  26  |  1.01    Ca    9.5      2018 03:27  Phos  4.2     01-03  Mg     2.4     01-03        CARDIAC MARKERS ( 2018 09:52 )  .082 ng/mL / x     / x     / x     / x      CARDIAC MARKERS ( 2018 03:27 )  .094 ng/mL / x     / 69 U/L / x     / 1.6 ng/mL      CAPILLARY BLOOD GLUCOSE                Assessment:  Milf troponin leak  Echo noted, normal EF  CT head noted  Old CVA  Fever  Normal CPK, and mild troponin leak, no ischemia work up at this time  Will wait until infectious etiologies improve

## 2018-01-04 NOTE — PHYSICAL THERAPY INITIAL EVALUATION ADULT - ADDITIONAL COMMENTS
Pt lives with her  & children in an apartment complex, + ramp access, 16 steps (+ rail) to apartment. Prior to admission pt was independent with all functional mobility including community ambulation with tripod cane. Pt is right hand dominant & wears eye glasses for reading & distance. Pt drives. Goal of therapy: return home.

## 2018-01-05 ENCOUNTER — TRANSCRIPTION ENCOUNTER (OUTPATIENT)
Age: 53
End: 2018-01-05

## 2018-01-05 VITALS
DIASTOLIC BLOOD PRESSURE: 71 MMHG | SYSTOLIC BLOOD PRESSURE: 124 MMHG | HEART RATE: 69 BPM | RESPIRATION RATE: 16 BRPM | OXYGEN SATURATION: 100 % | TEMPERATURE: 98 F

## 2018-01-05 LAB
HCT VFR BLD CALC: 33.4 % — LOW (ref 34.5–45)
HGB BLD-MCNC: 10.7 G/DL — LOW (ref 11.5–15.5)
MCHC RBC-ENTMCNC: 28.4 PG — SIGNIFICANT CHANGE UP (ref 27–34)
MCHC RBC-ENTMCNC: 31.9 GM/DL — LOW (ref 32–36)
MCV RBC AUTO: 89.1 FL — SIGNIFICANT CHANGE UP (ref 80–100)
PLATELET # BLD AUTO: 262 K/UL — SIGNIFICANT CHANGE UP (ref 150–400)
RBC # BLD: 3.75 M/UL — LOW (ref 3.8–5.2)
RBC # FLD: 13.8 % — SIGNIFICANT CHANGE UP (ref 11–15)
WBC # BLD: 13.3 K/UL — HIGH (ref 3.8–10.5)
WBC # FLD AUTO: 13.3 K/UL — HIGH (ref 3.8–10.5)

## 2018-01-05 PROCEDURE — 70553 MRI BRAIN STEM W/O & W/DYE: CPT | Mod: 26

## 2018-01-05 PROCEDURE — 99239 HOSP IP/OBS DSCHRG MGMT >30: CPT

## 2018-01-05 RX ORDER — ACETAMINOPHEN 500 MG
650 TABLET ORAL EVERY 6 HOURS
Qty: 0 | Refills: 0 | Status: DISCONTINUED | OUTPATIENT
Start: 2018-01-05 | End: 2018-01-05

## 2018-01-05 RX ORDER — ACETAMINOPHEN 500 MG
2 TABLET ORAL
Qty: 0 | Refills: 0 | COMMUNITY
Start: 2018-01-05

## 2018-01-05 RX ORDER — VERAPAMIL HCL 240 MG
1 CAPSULE, EXTENDED RELEASE PELLETS 24 HR ORAL
Qty: 30 | Refills: 0 | OUTPATIENT
Start: 2018-01-05 | End: 2018-02-03

## 2018-01-05 RX ADMIN — Medication 40 MILLIGRAM(S): at 04:41

## 2018-01-05 RX ADMIN — Medication 650 MILLIGRAM(S): at 04:36

## 2018-01-05 NOTE — PROGRESS NOTE ADULT - PROVIDER SPECIALTY LIST ADULT
Cardiology
Hospitalist
Infectious Disease
Internal Medicine
Neurology
Hospitalist

## 2018-01-05 NOTE — DISCHARGE NOTE ADULT - HOSPITAL COURSE
52 woman with PMH of stroke x2 resulting in residual left sided weakness, mitral valve regurgitation presents with headache, weakness, mild confusion or "not feeling like myself" and poor appetite x 2 weeks; she also notes 20 pound weight loss over the past 2 weeks.  She denies fever, chills, photophobia, recent sick contacts, travel outside of [place], neck pain.  She says she take no medications and is otherwise healthy.    In the ED, patient's CT head read as likely infection, leptomeningial disease; less likely hemorrhage.  LP done in ED, results pending.  Tmax 102, CBC shows leukocytosis with left shift, troponin mildly elevated.  EKG NSR.  patient was seen by infectious disease and felt it was not meningitis and sthe antibiotics were discontinued   patient seen by neurology who contacted her neurosurgeon dr costello who felt she can be discharge and Follow up with him 52 woman with PMH of stroke x2 resulting in residual left sided weakness, mitral valve regurgitation presents with headache, weakness, mild confusion or "not feeling like myself" and poor appetite x 2 weeks; she also notes 20 pound weight loss over the past 2 weeks.  She denies fever, chills, photophobia, recent sick contacts, travel outside of [place], neck pain.  She says she take no medications and is otherwise healthy.    In the ED, patient's CT head read as likely infection, leptomeningial disease; less likely hemorrhage.  LP done in ED, results pending.  Tmax 102, CBC shows leukocytosis with left shift, troponin mildly elevated.  EKG NSR.  patient was seen by infectious disease and felt it was not meningitis and sthe antibiotics were discontinued   patient seen by neurology who contacted her neurosurgeon dr costello who felt she can be discharge and Follow up with him    Spoke to Dr. Phillip Quevedo to d/c pt has appointment with Dr. Sterling on Monday at 3pm

## 2018-01-05 NOTE — PROGRESS NOTE ADULT - PROBLEM SELECTOR PROBLEM 2
Abnormal MRI of head
Mitral valve insufficiency, unspecified etiology
Weakness
Cerebrovascular accident (CVA), unspecified mechanism
Mitral valve insufficiency, unspecified etiology

## 2018-01-05 NOTE — PROGRESS NOTE ADULT - PROBLEM SELECTOR PROBLEM 1
Cerebrovascular accident (CVA), unspecified mechanism
Meningitis
Mitral valve insufficiency, unspecified etiology
SAH (subarachnoid hemorrhage)
Cerebrovascular accident (CVA), unspecified mechanism

## 2018-01-05 NOTE — PROGRESS NOTE ADULT - SUBJECTIVE AND OBJECTIVE BOX
52F, Mitral regurg, hx prior CVA/L weakness, UE>LE; adm w/weakness, headache, confusion, decr appetite, weight loss (20lbs in 2 weeks), fever, WBC 11.6, CT head c/f likely infection/leptomeningeal dz, s/p LP in ED w/NEGative results, influenza neg, CXR neg, MRI/MRA c/f R MCA aneurysm - initially for transfer to [facility] for angiogram as per Neuro/[facility], however Neurosurgery/Stroke MD at [facility] did not feel transfer was necessary, and pt could f/u as o/p -   sp repeat MRI   awaiting neuro to discharge   neuro notes seen   feels fine   at her baseline    Allergies    No Known Allergies    Intolerances        MEDICATIONS  (STANDING):  verapamil 40 milliGRAM(s) Oral daily    MEDICATIONS  (PRN):  acetaminophen   Tablet. 650 milliGRAM(s) Oral every 6 hours PRN Mild Pain (1 - 3)      REVIEW OF SYSTEMS:    feels fine   no cough   short of breath   no nausea vomiting diarrhea   VITAL SIGNS:  T(C): 36.9 (01-05-18 @ 13:01), Max: 36.9 (01-05-18 @ 13:01)  T(F): 98.4 (01-05-18 @ 13:01), Max: 98.4 (01-05-18 @ 13:01)  HR: 70 (01-05-18 @ 13:01) (67 - 86)  BP: 106/48 (01-05-18 @ 13:01) (106/48 - 120/56)  RR: 16 (01-05-18 @ 13:01) (16 - 18)  SpO2: 100% (01-05-18 @ 13:01) (99% - 100%)  Wt(kg): --    PHYSICAL EXAM:    GENERAL: not in any distress  HEENT: Neck is supple, normocephalic, atraumatic   CHEST/LUNG: Clear to auscultation bilaterally; No rales, rhonchi, wheezing  HEART: Regular rate and rhythm; systolic murmurs, rubs, or gallops  ABDOMEN: Soft, Nontender, Nondistended; Bowel sounds present, no rebound   EXTREMITIES:  2+ Peripheral Pulses, No clubbing, cyanosis, or edema  left weakness contractuire  SKIN: No rashes or lesions  BACK: no pressor sore   NERVOUS SYSTEM:  Alert & Oriented X3, Good concentration  PSYCH: normal affect     LABS:                         10.7   13.3  )-----------( 262      ( 05 Jan 2018 08:02 )             33.4                           Sedimentation Rate, Erythrocyte: 39 mm/hr (01-02 @ 18:09)            Culture Results:   No growth to date. (01-01 @ 13:09)  Culture Results:   No growth to date. (01-01 @ 13:09)  Culture Results:   No growth (01-01 @ 03:33)  Culture Results:   No growth (12-31 @ 23:11)                Radiology:      < from: MR Head w/wo IV Cont (01.05.18 @ 12:31) >  MPRESSION:    Tiny acute lacunar infarct in the left cerebellum, new. Trace stable   subarachnoid hemorrhage. Chronic microvascular ischemic changes. Negative   for enhancing mass lesion                ELISABETH MEZA M.D., ATTENDING RADIOLOGIST  This document has been electronically signed. Jan 5 2018 12:10PM                < end of copied text >

## 2018-01-05 NOTE — DISCHARGE NOTE ADULT - CARE PLAN
Principal Discharge DX:	Elevated troponin  Goal:	none  Instructions for follow-up, activity and diet:	cardiology cleared  Secondary Diagnosis:	Abnormal MRI of head  Instructions for follow-up, activity and diet:	Follow up with dr costello  Secondary Diagnosis:	Leukocytosis, unspecified type

## 2018-01-05 NOTE — DISCHARGE NOTE ADULT - MEDICATION SUMMARY - MEDICATIONS TO TAKE
I will START or STAY ON the medications listed below when I get home from the hospital:    acetaminophen 325 mg oral tablet  -- 2 tab(s) by mouth every 6 hours, As needed, Mild Pain (1 - 3)  -- Indication: For Preventive measure I will START or STAY ON the medications listed below when I get home from the hospital:    acetaminophen 325 mg oral tablet  -- 2 tab(s) by mouth every 6 hours, As needed, Mild Pain (1 - 3)  -- Indication: For Preventive measure    verapamil 40 mg oral tablet  -- 1 tab(s) by mouth once a day  -- Indication: For HTN

## 2018-01-05 NOTE — DISCHARGE NOTE ADULT - CARE PROVIDER_API CALL
Korey Sterling), Neurological Surgery  [address] [place] [address]  Phone: [phone]  Fax: [phone]

## 2018-01-05 NOTE — PROGRESS NOTE ADULT - SUBJECTIVE AND OBJECTIVE BOX
Assessment:  Mild troponin leak  Echo noted, normal EF  CT head noted  Old CVA  Fever  Normal CPK, and mild troponin leak, no ischemia work up at this time  Will wait until infectious etiologies improve

## 2018-01-05 NOTE — DISCHARGE NOTE ADULT - PATIENT PORTAL LINK FT
“You can access the Follow[facility] Patient Portal, offered by [facility], by registering with the following website: [email or web address]”

## 2018-01-05 NOTE — PROGRESS NOTE ADULT - PROBLEM SELECTOR PROBLEM 3
Abnormal MRI of head
Elevated troponin
Weakness
Weakness
Elevated troponin

## 2018-01-06 LAB
CULTURE RESULTS: SIGNIFICANT CHANGE UP
CULTURE RESULTS: SIGNIFICANT CHANGE UP
SPECIMEN SOURCE: SIGNIFICANT CHANGE UP
SPECIMEN SOURCE: SIGNIFICANT CHANGE UP

## 2018-01-08 ENCOUNTER — APPOINTMENT (OUTPATIENT)
Dept: NEUROSURGERY | Facility: CLINIC | Age: 53
End: 2018-01-08
Payer: MEDICARE

## 2018-01-08 VITALS
BODY MASS INDEX: 19.62 KG/M2 | TEMPERATURE: 98.2 F | DIASTOLIC BLOOD PRESSURE: 77 MMHG | HEART RATE: 80 BPM | HEIGHT: 67 IN | SYSTOLIC BLOOD PRESSURE: 149 MMHG | WEIGHT: 125 LBS | OXYGEN SATURATION: 98 %

## 2018-01-08 LAB
WNV RNA SPEC QL NAA+PROBE: NEGATIVE — SIGNIFICANT CHANGE UP
WNV RNA SPEC QL NAA+PROBE: SIGNIFICANT CHANGE UP

## 2018-01-08 PROCEDURE — 99203 OFFICE O/P NEW LOW 30 MIN: CPT

## 2018-01-09 DIAGNOSIS — R79.89 OTHER SPECIFIED ABNORMAL FINDINGS OF BLOOD CHEMISTRY: ICD-10-CM

## 2018-01-09 DIAGNOSIS — R50.9 FEVER, UNSPECIFIED: ICD-10-CM

## 2018-01-09 DIAGNOSIS — I60.8 OTHER NONTRAUMATIC SUBARACHNOID HEMORRHAGE: ICD-10-CM

## 2018-01-09 DIAGNOSIS — R63.0 ANOREXIA: ICD-10-CM

## 2018-01-09 DIAGNOSIS — D72.829 ELEVATED WHITE BLOOD CELL COUNT, UNSPECIFIED: ICD-10-CM

## 2018-01-09 DIAGNOSIS — R51 HEADACHE: ICD-10-CM

## 2018-01-09 DIAGNOSIS — I34.0 NONRHEUMATIC MITRAL (VALVE) INSUFFICIENCY: ICD-10-CM

## 2018-01-09 DIAGNOSIS — R63.4 ABNORMAL WEIGHT LOSS: ICD-10-CM

## 2018-01-09 DIAGNOSIS — I69.354 HEMIPLEGIA AND HEMIPARESIS FOLLOWING CEREBRAL INFARCTION AFFECTING LEFT NON-DOMINANT SIDE: ICD-10-CM

## 2018-01-25 ENCOUNTER — INPATIENT (INPATIENT)
Facility: HOSPITAL | Age: 53
LOS: 5 days | Discharge: HOME | DRG: 288 | End: 2018-01-31
Attending: INTERNAL MEDICINE | Admitting: INTERNAL MEDICINE
Payer: MEDICARE

## 2018-01-25 ENCOUNTER — APPOINTMENT (OUTPATIENT)
Dept: CV DIAGNOSITCS | Facility: HOSPITAL | Age: 53
End: 2018-01-25

## 2018-01-25 VITALS
TEMPERATURE: 98 F | RESPIRATION RATE: 16 BRPM | DIASTOLIC BLOOD PRESSURE: 61 MMHG | HEART RATE: 74 BPM | OXYGEN SATURATION: 97 % | SYSTOLIC BLOOD PRESSURE: 93 MMHG

## 2018-01-25 DIAGNOSIS — Z98.891 HISTORY OF UTERINE SCAR FROM PREVIOUS SURGERY: Chronic | ICD-10-CM

## 2018-01-25 DIAGNOSIS — Z98.51 TUBAL LIGATION STATUS: Chronic | ICD-10-CM

## 2018-01-25 DIAGNOSIS — I67.89 OTHER CEREBROVASCULAR DISEASE: ICD-10-CM

## 2018-01-25 LAB
ANION GAP SERPL CALC-SCNC: 8 MMOL/L — SIGNIFICANT CHANGE UP (ref 5–17)
APPEARANCE UR: ABNORMAL
BILIRUB UR-MCNC: NEGATIVE — SIGNIFICANT CHANGE UP
BUN SERPL-MCNC: 11 MG/DL — SIGNIFICANT CHANGE UP (ref 7–23)
CALCIUM SERPL-MCNC: 9.2 MG/DL — SIGNIFICANT CHANGE UP (ref 8.4–10.5)
CHLORIDE SERPL-SCNC: 100 MMOL/L — SIGNIFICANT CHANGE UP (ref 96–108)
CO2 SERPL-SCNC: 27 MMOL/L — SIGNIFICANT CHANGE UP (ref 22–31)
COLOR SPEC: YELLOW — SIGNIFICANT CHANGE UP
CREAT SERPL-MCNC: 1.07 MG/DL — SIGNIFICANT CHANGE UP (ref 0.5–1.3)
DIFF PNL FLD: NEGATIVE — SIGNIFICANT CHANGE UP
GLUCOSE SERPL-MCNC: 106 MG/DL — HIGH (ref 70–99)
GLUCOSE UR QL: NEGATIVE — SIGNIFICANT CHANGE UP
HCT VFR BLD CALC: 32.1 % — LOW (ref 34.5–45)
HGB BLD-MCNC: 10.8 G/DL — LOW (ref 11.5–15.5)
KETONES UR-MCNC: NEGATIVE — SIGNIFICANT CHANGE UP
LEUKOCYTE ESTERASE UR-ACNC: NEGATIVE — SIGNIFICANT CHANGE UP
MCHC RBC-ENTMCNC: 30.5 PG — SIGNIFICANT CHANGE UP (ref 27–34)
MCHC RBC-ENTMCNC: 33.7 GM/DL — SIGNIFICANT CHANGE UP (ref 32–36)
MCV RBC AUTO: 90.4 FL — SIGNIFICANT CHANGE UP (ref 80–100)
NITRITE UR-MCNC: NEGATIVE — SIGNIFICANT CHANGE UP
PH UR: 6 — SIGNIFICANT CHANGE UP (ref 5–8)
PLATELET # BLD AUTO: 209 K/UL — SIGNIFICANT CHANGE UP (ref 150–400)
POTASSIUM SERPL-MCNC: 4 MMOL/L — SIGNIFICANT CHANGE UP (ref 3.5–5.3)
POTASSIUM SERPL-SCNC: 4 MMOL/L — SIGNIFICANT CHANGE UP (ref 3.5–5.3)
PROT UR-MCNC: SIGNIFICANT CHANGE UP
RBC # BLD: 3.55 M/UL — LOW (ref 3.8–5.2)
RBC # FLD: 15.2 % — HIGH (ref 10.3–14.5)
SODIUM SERPL-SCNC: 135 MMOL/L — SIGNIFICANT CHANGE UP (ref 135–145)
SP GR SPEC: 1.02 — SIGNIFICANT CHANGE UP (ref 1.01–1.02)
UROBILINOGEN FLD QL: NEGATIVE — SIGNIFICANT CHANGE UP
WBC # BLD: 8.7 K/UL — SIGNIFICANT CHANGE UP (ref 3.8–10.5)
WBC # FLD AUTO: 8.7 K/UL — SIGNIFICANT CHANGE UP (ref 3.8–10.5)

## 2018-01-25 PROCEDURE — 93306 TTE W/DOPPLER COMPLETE: CPT | Mod: 26

## 2018-01-25 PROCEDURE — 76376 3D RENDER W/INTRP POSTPROCES: CPT | Mod: 26

## 2018-01-25 PROCEDURE — 93312 ECHO TRANSESOPHAGEAL: CPT | Mod: 26

## 2018-01-25 PROCEDURE — 99222 1ST HOSP IP/OBS MODERATE 55: CPT

## 2018-01-25 RX ORDER — ACETAMINOPHEN 500 MG
650 TABLET ORAL EVERY 6 HOURS
Qty: 0 | Refills: 0 | Status: DISCONTINUED | OUTPATIENT
Start: 2018-01-25 | End: 2018-01-31

## 2018-01-25 RX ORDER — SODIUM CHLORIDE 9 MG/ML
1000 INJECTION INTRAMUSCULAR; INTRAVENOUS; SUBCUTANEOUS
Qty: 0 | Refills: 0 | Status: DISCONTINUED | OUTPATIENT
Start: 2018-01-25 | End: 2018-01-31

## 2018-01-25 RX ORDER — ACETAMINOPHEN 500 MG
650 TABLET ORAL EVERY 6 HOURS
Qty: 0 | Refills: 0 | Status: DISCONTINUED | OUTPATIENT
Start: 2018-01-25 | End: 2018-01-25

## 2018-01-25 RX ADMIN — SODIUM CHLORIDE 75 MILLILITER(S): 9 INJECTION INTRAMUSCULAR; INTRAVENOUS; SUBCUTANEOUS at 22:00

## 2018-01-25 NOTE — H&P CARDIOLOGY - HISTORY OF PRESENT ILLNESS
46 y/o femalw with past medical hx of CVA 02/2018 52 year old female with  PMHx of CVA x 2 ( 1993 & 2003)  presenting for outpt MVR evaluation via AUSTIN as recommended by neurology and cardiology found to have fever as suspected endocarditis admitted for further evaluation .

## 2018-01-25 NOTE — H&P CARDIOLOGY - ATTENDING COMMENTS
Pt seen and examined at bedside. Agree with assessment and plan   Gentle IVF   Blood and urine culltures  ID and CTS consults  PRN Tylenol   DVT PPX

## 2018-01-25 NOTE — H&P CARDIOLOGY - PMH
CVA (cerebral vascular accident)  12/02/2001 CVA (cerebral vascular accident)  12/02/2001  Mitral valve regurgitation

## 2018-01-26 ENCOUNTER — APPOINTMENT (OUTPATIENT)
Dept: ULTRASOUND IMAGING | Facility: IMAGING CENTER | Age: 53
End: 2018-01-26

## 2018-01-26 ENCOUNTER — TRANSCRIPTION ENCOUNTER (OUTPATIENT)
Age: 53
End: 2018-01-26

## 2018-01-26 DIAGNOSIS — I33.0 ACUTE AND SUBACUTE INFECTIVE ENDOCARDITIS: ICD-10-CM

## 2018-01-26 PROBLEM — I63.9 CEREBRAL INFARCTION, UNSPECIFIED: Chronic | Status: ACTIVE | Noted: 2017-12-31

## 2018-01-26 LAB
CULTURE RESULTS: SIGNIFICANT CHANGE UP
SPECIMEN SOURCE: SIGNIFICANT CHANGE UP

## 2018-01-26 PROCEDURE — 99232 SBSQ HOSP IP/OBS MODERATE 35: CPT

## 2018-01-26 PROCEDURE — 70496 CT ANGIOGRAPHY HEAD: CPT | Mod: 26

## 2018-01-26 RX ORDER — CEFTRIAXONE 500 MG/1
2 INJECTION, POWDER, FOR SOLUTION INTRAMUSCULAR; INTRAVENOUS EVERY 24 HOURS
Qty: 0 | Refills: 0 | Status: DISCONTINUED | OUTPATIENT
Start: 2018-01-27 | End: 2018-01-31

## 2018-01-26 RX ORDER — CEFTRIAXONE 500 MG/1
INJECTION, POWDER, FOR SOLUTION INTRAMUSCULAR; INTRAVENOUS
Qty: 0 | Refills: 0 | Status: DISCONTINUED | OUTPATIENT
Start: 2018-01-26 | End: 2018-01-26

## 2018-01-26 RX ORDER — CEFTRIAXONE 500 MG/1
2 INJECTION, POWDER, FOR SOLUTION INTRAMUSCULAR; INTRAVENOUS ONCE
Qty: 0 | Refills: 0 | Status: COMPLETED | OUTPATIENT
Start: 2018-01-26 | End: 2018-01-26

## 2018-01-26 RX ORDER — CEFTRIAXONE 500 MG/1
INJECTION, POWDER, FOR SOLUTION INTRAMUSCULAR; INTRAVENOUS
Qty: 0 | Refills: 0 | Status: DISCONTINUED | OUTPATIENT
Start: 2018-01-26 | End: 2018-01-31

## 2018-01-26 RX ORDER — VANCOMYCIN HCL 1 G
1000 VIAL (EA) INTRAVENOUS EVERY 12 HOURS
Qty: 0 | Refills: 0 | Status: DISCONTINUED | OUTPATIENT
Start: 2018-01-26 | End: 2018-01-27

## 2018-01-26 RX ADMIN — CEFTRIAXONE 100 GRAM(S): 500 INJECTION, POWDER, FOR SOLUTION INTRAMUSCULAR; INTRAVENOUS at 10:31

## 2018-01-26 RX ADMIN — Medication 250 MILLIGRAM(S): at 17:43

## 2018-01-26 RX ADMIN — Medication 650 MILLIGRAM(S): at 13:30

## 2018-01-26 NOTE — CHART NOTE - NSCHARTNOTEFT_GEN_A_CORE
Called by RN to report critical lab value blood culture from 1/25 - Growth in anaerobic gram+ cocci in pairs and chain  Seen and examined patient at bedside. Patient is symptomatic ( c/o fever). ID: Dr. Marlen atwood and [phone] called. Awaiting for call back. Patient is currently on Ceftriaxone and Vanco IV             Phone: [phone] Called by RN to report critical lab value blood culture from 1/25 - Growth in anaerobic gram+ cocci in pairs and chain  Seen and examined patient at bedside. Patient is symptomatic ( c/o fever). ID: Dr. Gee pageshruti and [phone] called. Awaiting for call back. Patient is currently on Ceftriaxone and Vanco IV D/w Dr. Machuca, no intervention at this time. F/u PCR in 2 hrs, if vanco resistance then will need to change iv ABX regimen.            Phone: [phone] Called by RN to report critical lab value blood culture from 1/25 - Growth in aerobic and anaerobic gram+ cocci in pairs and chain  Seen and examined patient at bedside. Patient is symptomatic ( c/o fever). ID: Dr. Gee pageshruti and [phone] called. Awaiting for call back. Patient is currently on Ceftriaxone and Vanco IV D/w Dr. Machuca, no intervention at this time. F/u PCR in 2 hrs, if vanco resistance then will need to change iv ABX regimen.            Phone: [phone]

## 2018-01-26 NOTE — PROGRESS NOTE ADULT - SUBJECTIVE AND OBJECTIVE BOX
Subjective: Patient seen and examined.   Admitted yesterday  Chart reviewed.  Abnormal AUSTIN after fever 102F  Noted vegetation on Mitral valve leaflets with prolapse/flail portions of valve causing severe MR.  Pt is not symptomatic from heart failure perspective but has been having fevers.    She reports a history of CVA x2 in past (first at age 28).   Was on coumadin in past but developed GI bleeding/ulcers.    REVIEW OF SYSTEMS:    CONSTITUTIONAL: +weakness, fevers or chills  EYES/ENT: No visual changes;  No vertigo or throat pain   NECK: No pain or stiffness  RESPIRATORY: No cough, wheezing, hemoptysis; No shortness of breath  CARDIOVASCULAR: No chest pain or palpitations  GASTROINTESTINAL: No abdominal or epigastric pain. No nausea, vomiting, or hematemesis; No diarrhea or constipation. No melena or hematochezia.  GENITOURINARY: No dysuria, frequency or hematuria  NEUROLOGICAL: No numbness, + weakness - L (residual stroke)  SKIN: No itching, burning, rashes, or lesions   All other review of systems is negative unless indicated above.    MEDICATIONS:  MEDICATIONS  (STANDING):  cefTRIAXone   IVPB      sodium chloride 0.9%. 1000 milliLiter(s) (75 mL/Hr) IV Continuous <Continuous>  vancomycin  IVPB 1000 milliGRAM(s) IV Intermittent every 12 hours      PHYSICAL EXAM:  T(F): 100.4 (01-26-18 @ 13:25), Max: 100.4 (01-26-18 @ 13:25)  HR: 82 (01-26-18 @ 13:25) (62 - 89)  BP: 101/56 (01-26-18 @ 13:25) (87/50 - 116/65)  RR: 18 (01-26-18 @ 13:25) (13 - 18)  SpO2: 99% (01-26-18 @ 13:25) (97% - 99%)  I&O's Summary    25 Jan 2018 07:01  -  26 Jan 2018 07:00  --------------------------------------------------------  IN: 660 mL / OUT: 0 mL / NET: 660 mL    26 Jan 2018 07:01  -  26 Jan 2018 17:00  --------------------------------------------------------  IN: 480 mL / OUT: 0 mL / NET: 480 mL      Height (cm): 170.18 (01-25 @ 19:48)  Weight (kg): 56.7 (01-25 @ 19:48)  BMI (kg/m2): 19.6 (01-25 @ 19:48)  BSA (m2): 1.66 (01-25 @ 19:48)    Appearance: Normal, NAD	  HEENT:   Normal oral mucosa, PERRL, EOMI	  Lymphatic: No lymphadenopathy , no edema  Cardiovascular: Normal S1 S2, No JVD, No murmurs , Peripheral pulses palpable 2+ bilaterally  Respiratory: Lungs clear to auscultation, normal effort 	  Gastrointestinal:  Soft, Non-tender, + BS	  Skin: No rashes, No ecchymoses, No cyanosis, warm to touch  Neuro: Left hemiparesis  Psychiatry:  Mood & affect appropriate  Ext: No edema      LABS:                          10.8   8.7   )-----------( 209      ( 25 Jan 2018 22:57 )             32.1     01-25    135  |  100  |  11  ----------------------------<  106<H>  4.0   |  27  |  1.07    Ca    9.2      25 Jan 2018 22:57            TELEMETRY: 	NSR    ECG:  	    RADIOLOGY:     DIAGNOSTIC TESTING:    Echocardiogram:  AUSTIN w/TTE (w/3D Echo) (01.25.18 @ 14:15) >  Conclusions:  1. Mitral valve prolapse involving the anterior mitral  leaflet with flail A2/3 segment. There is a small mobile  independently mobile short linear-like echodensity at the  P2 /P3 annulus which measures 0.4cm x0.3cm. This may  represent a vegetation. The tips of the anterior mitral  valve leaflet is thickened  and measures up to 6mm. Severe  mitral regurgitation eccentric posterior mitral  regurgitation.  Flow reversal is seen in the pulmonary  veins. ERO by PISA is 0.67sqcm  2. Trileaflet aortic valve with normal opening. The  leaflets of the aortic valve prolapse.  3. Mild left ventricular enlargement.  4. Normal left ventricular systolic function.  5. Normal right ventricular size and function.  *** No previous Echo exam.  Findings discussed with Dr. Odilon Cash at time of AUSTIN.

## 2018-01-26 NOTE — DISCHARGE NOTE ADULT - SECONDARY DIAGNOSIS.
CVA (cerebral vascular accident) SAH (subarachnoid hemorrhage) Mitral valve insufficiency, unspecified etiology

## 2018-01-26 NOTE — DISCHARGE NOTE ADULT - PLAN OF CARE
Complete resolution Follow up with Cardiology - Dr. Cash on 2/5/18  Follow up with Dr. Davis on 1 week - call for appointment  Continue with Ceftriaxone 2 gm iv q24h until 2/23/18  Gentamicin 140mg IV daily until 2/10/18 via PICC   Weekly CBC monitoring and send results to Dr. Davis  Take all antibiotics as ordered.  If you develop fever, chills, malaise, chest pain, shortness of breath or change in mental status call your Health Care Provider immediately or go to the Emergency Department.  Nutrition is important, eat small frequent meals to help ensure you get adequate calories.  Do not stay in bed all day!  Increase your activity daily as tolerated. Seek medical help for headache, vision changes, change in mental status, weakness, slurred speech Seek medical help for headache, vision changes, change in mental status, weakness, slurred speech  Follow up with NeuroS[facility] - Dr. Korey Sterling in 1 week - call for appointment Follow up with Cardiology - Dr. Cash on 2/5/18  Seek immediate medical help for chest pain, shortness of breath, change in mental status

## 2018-01-26 NOTE — CONSULT NOTE ADULT - PROBLEM SELECTOR RECOMMENDATION 9
Continue with current medication regimen and IV ABx therapy as per ID. Awaiting BC Sensitivity.   Medical treatment, no cardiac surgery or intervention warranted this time   Follow up with Neurology and Neuro surgery for Cerebral CTA and Diagnostic imaging to r/o mycotic aneurysm.

## 2018-01-26 NOTE — DIETITIAN INITIAL EVALUATION ADULT. - OTHER INFO
Pt seen for nutrition consult for unintentional weight loss. Pt reports currently good appetite and PO intake, as per chart PO intake 100% of meals in house, pt requesting new meal for lunch and stated feeling hungry. Pt's current weight is 125 lbs. As per previous RD note pt's weight was 128.3 lbs (1/3/18). Pt reports a UBW of 140 lbs back in December 2017. Pt states that she experienced some unintentional weight loss secondary to not feeling well, however pt stated that her appetite has improved significantly and she thinks she has gained 2-5 lbs over the past couple weeks. Pt reports some swallowing difficulty secondary to sore throat from AUSTIN, but states that she is trying to consume a few bites of food and then some water which helps her throat to feel better. Pt reports lose BM today, but denies any other GI distress. Pt denies any food allergies or intolerances.

## 2018-01-26 NOTE — CONSULT NOTE ADULT - ASSESSMENT
52 yr with hx of two cva yrs ago, ( unexplained) mitral insufficiency, recently admitted after outpt [place] demonstrated possible vegetation and she was found to have fever 102 at that time. Denies previously knowing that she was febrile .  No recent dental work but has lost 20 lbs of weight unintentionally.  no travel.     The story is very good for endocarditis despite  the fact that she had negative blood cultures earlier this month.       would like to do several more sets of blood culture but I dont see how we can avoid treating for endocarditis  will start after additional cultures 52 yr with hx of two cva yrs ago, ( unexplained) mitral insufficiency, recently admitted after outpt [place] demonstrated possible vegetation and she was found to have fever 102 at that time. Denies previously knowing that she was febrile .  No recent dental work but has lost 20 lbs of weight unintentionally.  no travel.     The story is very good for endocarditis despite  the fact that she had negative blood cultures earlier this month.       would like to do several more sets of blood culture but I dont see how we can avoid treating for endocarditis  will start after additional cultures    would also do a CTA to ro mycotic aneurysm

## 2018-01-26 NOTE — PROVIDER CONTACT NOTE (CRITICAL VALUE NOTIFICATION) - TEST AND RESULT REPORTED:
Blood cx from 1/25/18 - growth in anarobic bottle-gram positive cocci in pairs and chains  Blood cx from 1/25/18 - growth in anarobic bottle-gram positive cocci in pairs and chains

## 2018-01-26 NOTE — DISCHARGE NOTE ADULT - NS AS DC STROKE ED MATERIALS
Need for Followup After Discharge/Call 911 for Stroke/Prescribed Medications/Stroke Education Booklet/Stroke Warning Signs and Symptoms/Risk Factors for Stroke

## 2018-01-26 NOTE — CONSULT NOTE ADULT - SUBJECTIVE AND OBJECTIVE BOX
Neurology Consult    Patient is a 52y old  Female who presents with a chief complaint of stroke.     HPI:  52 year old female with  PMHx of CVA x 2 (  & )  presenting for outpt MVR evaluation via [facility] as recommended by neurology and cardiology found to have fever as suspected endocarditis admitted for further evaluation . During course patient had MRI showing small L cerebellar infarct, and CTA imaging with bulbous prominence. Neurology consulted for further management. Patient denies any headache, blurred vision, or focal defcits at this time.       PAST MEDICAL & SURGICAL HISTORY:  Mitral valve regurgitation  CVA (cerebral vascular accident): 2001  H/O tubal ligation:   History of  section: 2001      Allergies    No Known Allergies    Intolerances        MEDICATIONS  (STANDING):  cefTRIAXone   IVPB      sodium chloride 0.9%. 1000 milliLiter(s) (75 mL/Hr) IV Continuous <Continuous>  vancomycin  IVPB 1000 milliGRAM(s) IV Intermittent every 12 hours    MEDICATIONS  (PRN):  acetaminophen   Tablet 650 milliGRAM(s) Oral every 6 hours PRN For Temp greater than 38 C (100.4 F)      Social History: Denies tob, EtOH use     FAMILY HISTORY:  No pertinent family history in first degree relatives      Review of Systems:  CONSTITUTIONAL:  No weight loss, fever, chills, weakness or fatigue.  HEENT:  Eyes:  No visual loss, blurred vision, double vision or yellow sclerae. Ears, Nose, Throat:  No hearing loss, sneezing, congestion, runny nose or sore throat.  SKIN:  No rash or itching.  CARDIOVASCULAR:  No chest pain, chest pressure or chest discomfort. No palpitations or edema.  RESPIRATORY:  No shortness of breath, cough or sputum.  GASTROINTESTINAL:  No anorexia, nausea, vomiting or diarrhea. No abdominal pain or blood.  GENITOURINARY:  Burning on urination. Pregnancy. Last menstrual period, MM/DD/YYYY.  NEUROLOGICAL:  No headache, dizziness, syncope, paralysis, ataxia, numbness or tingling in the extremities. No change in bowel or bladder control.  MUSCULOSKELETAL:  No muscle, back pain, joint pain or stiffness.  HEMATOLOGIC:  No anemia, bleeding or bruising.  LYMPHATICS:  No enlarged nodes. No history of splenectomy.  PSYCHIATRIC:  No history of depression or anxiety.  ENDOCRINOLOGIC:  No reports of sweating, cold or heat intolerance. No polyuria or polydipsia.      Physical Exam:   Vital Signs Last 24 Hrs  T(C): 36.8 (2018 05:40), Max: 36.9 (2018 18:30)  T(F): 98.2 (2018 05:40), Max: 98.4 (2018 18:30)  HR: 83 (2018 05:40) (62 - 83)  BP: 106/48 (2018 05:40) (87/50 - 106/48)  BP(mean): 71 (2018 18:49) (71 - 71)  RR: 17 (2018 05:40) (13 - 17)  SpO2: 97% (2018 05:40) (97% - 99%)    General Exam:     Labs:     CBC Full  -  ( 2018 22:57 )  WBC Count : 8.7 K/uL  Hemoglobin : 10.8 g/dL  Hematocrit : 32.1 %  Platelet Count - Automated : 209 K/uL  Mean Cell Volume : 90.4 fl  Mean Cell Hemoglobin : 30.5 pg  Mean Cell Hemoglobin Concentration : 33.7 gm/dL  Auto Neutrophil # : x  Auto Lymphocyte # : x  Auto Monocyte # : x  Auto Eosinophil # : x  Auto Basophil # : x  Auto Neutrophil % : x  Auto Lymphocyte % : x  Auto Monocyte % : x  Auto Eosinophil % : x  Auto Basophil % : x    01-25    135  |  100  |  11  ----------------------------<  106<H>  4.0   |  27  |  1.07    Ca    9.2      2018 22:57          Urinalysis Basic - ( 2018 22:54 )    Color: Yellow / Appearance: SL Turbid / S.018 / pH: x  Gluc: x / Ketone: Negative  / Bili: Negative / Urobili: Negative   Blood: x / Protein: Trace / Nitrite: Negative   Leuk Esterase: Negative / RBC: 0-2 /HPF / WBC 2-5 /HPF   Sq Epi: x / Non Sq Epi: Few /HPF / Bacteria: Few /HPF Neurology Consult    Patient is a 52y old  Female who presents with a chief complaint of stroke.     HPI:  52 year old female with  PMHx of CVA x 2 (  & )  presenting for outpt MVR evaluation via [facility] as recommended by neurology and cardiology found to have fever as suspected endocarditis admitted for further evaluation . During course patient had MRI showing small L cerebellar infarct, and CTA imaging with bulbous prominence. Neurology consulted for further management. Patient denies any headache, blurred vision, or focal defcits at this time.       PAST MEDICAL & SURGICAL HISTORY:  Mitral valve regurgitation  CVA (cerebral vascular accident): 2001  H/O tubal ligation:   History of  section: 2001      Allergies    No Known Allergies    Intolerances        MEDICATIONS  (STANDING):  cefTRIAXone   IVPB      sodium chloride 0.9%. 1000 milliLiter(s) (75 mL/Hr) IV Continuous <Continuous>  vancomycin  IVPB 1000 milliGRAM(s) IV Intermittent every 12 hours    MEDICATIONS  (PRN):  acetaminophen   Tablet 650 milliGRAM(s) Oral every 6 hours PRN For Temp greater than 38 C (100.4 F)      Social History: Denies tob, EtOH use     FAMILY HISTORY:  No pertinent family history in first degree relatives      Review of Systems:  as per HPI        Physical Exam:   Vital Signs Last 24 Hrs  T(C): 36.8 (2018 05:40), Max: 36.9 (2018 18:30)  T(F): 98.2 (2018 05:40), Max: 98.4 (2018 18:30)  HR: 83 (2018 05:40) (62 - 83)  BP: 106/48 (2018 05:40) (87/50 - 106/48)  BP(mean): 71 (2018 18:49) (71 - 71)  RR: 17 (2018 05:40) (13 - 17)  SpO2: 97% (2018 05:40) (97% - 99%)    General Exam:   AOX3, follows commands, normal speech, normal cognition, able to name/repeat/comprehend  EOMi, PERRLA, no nystagmus, no APD,  VVF, V1-V3 normal, no facial assymetry, normal shrug, tongue midline  5/5 all extremities, tone normal, bulk normal, no drift  Sensation intact to light and deep tough throughou, vibration intact  gait deffered     Labs:     CBC Full  -  ( 2018 22:57 )  WBC Count : 8.7 K/uL  Hemoglobin : 10.8 g/dL  Hematocrit : 32.1 %  Platelet Count - Automated : 209 K/uL  Mean Cell Volume : 90.4 fl  Mean Cell Hemoglobin : 30.5 pg  Mean Cell Hemoglobin Concentration : 33.7 gm/dL  Auto Neutrophil # : x  Auto Lymphocyte # : x  Auto Monocyte # : x  Auto Eosinophil # : x  Auto Basophil # : x  Auto Neutrophil % : x  Auto Lymphocyte % : x  Auto Monocyte % : x  Auto Eosinophil % : x  Auto Basophil % : x        135  |  100  |  11  ----------------------------<  106<H>  4.0   |  27  |  1.07    Ca    9.2      2018 22:57          Urinalysis Basic - ( 2018 22:54 )    Color: Yellow / Appearance: SL Turbid / S.018 / pH: x  Gluc: x / Ketone: Negative  / Bili: Negative / Urobili: Negative   Blood: x / Protein: Trace / Nitrite: Negative   Leuk Esterase: Negative / RBC: 0-2 /HPF / WBC 2-5 /HPF   Sq Epi: x / Non Sq Epi: Few /HPF / Bacteria: Few /HPF

## 2018-01-26 NOTE — DIETITIAN INITIAL EVALUATION ADULT. - ENERGY NEEDS
Ht: 67in, Wt: 125lbs, BMI: 19.5, IBW: 135lbs, IBW+/-10%= 122-149lbs, %IBW: 93  Other pertinent objective information: 52 year old female admitted for a MVR evaluation via [facility]. Pt with PMH of CVA x2, mitral valve regurgitation. 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 Pt with no noted edema or pressure ulcers at this time.

## 2018-01-26 NOTE — DISCHARGE NOTE ADULT - CARE PLAN
Principal Discharge DX:	Acute infective endocarditis, due to unspecified organism  Goal:	Complete resolution  Assessment and plan of treatment:	Follow up with Cardiology - Dr. Cash on 2/5/18  Follow up with Dr. Davis on 1 week - call for appointment  Continue with Ceftriaxone 2 gm iv q24h until 2/23/18  Gentamicin 140mg IV daily until 2/10/18 via PICC   Weekly CBC monitoring and send results to Dr. Davis  Take all antibiotics as ordered.  If you develop fever, chills, malaise, chest pain, shortness of breath or change in mental status call your Health Care Provider immediately or go to the Emergency Department.  Nutrition is important, eat small frequent meals to help ensure you get adequate calories.  Do not stay in bed all day!  Increase your activity daily as tolerated.  Secondary Diagnosis:	CVA (cerebral vascular accident)  Assessment and plan of treatment:	Seek medical help for headache, vision changes, change in mental status, weakness, slurred speech  Secondary Diagnosis:	SAH (subarachnoid hemorrhage)  Assessment and plan of treatment:	Seek medical help for headache, vision changes, change in mental status, weakness, slurred speech  Follow up with NeuroSx - Dr. Korey Sterling in 1 week - call for appointment  Secondary Diagnosis:	Mitral valve insufficiency, unspecified etiology  Assessment and plan of treatment:	Follow up with Cardiology - Dr. Cash on 2/5/18  Seek immediate medical help for chest pain, shortness of breath, change in mental status

## 2018-01-26 NOTE — DISCHARGE NOTE ADULT - MEDICATION SUMMARY - MEDICATIONS TO STOP TAKING
I will STOP taking the medications listed below when I get home from the hospital:    verapamil 40 mg oral tablet  -- 1 tab(s) by mouth once a day

## 2018-01-26 NOTE — DIETITIAN INITIAL EVALUATION ADULT. - ORAL INTAKE PTA
good/Pt reports a good appetite and PO intake PTA. Pt's typical daily intake: Breakfast- cream of wheat, oatmeal, cold cereal with milk (weekdays), pancakes, eggs, smith, sausage, juice (weekends); Lunch- peanut butter and jelly sandwich, sandwich with sliced meat, ravioli ; Dinner-baked chicken, boiled chicken, liver, kidney; Snacks- sunflower seeds, apple, potato sticks (sometimes). Pt's beverages PTA include seltzer, orange juice, and a lot of water. Pt supplement use PTA includes Ensure about 3/week, and denies any Vitamin use. Pt states that about a month ago her appetite began to decrease. Pt thinks that she had ome type of illness such as the flu, or a bad cold which is what caused her appetite to begin to decrease. Pt states that her poor appetite persisted and she no longer wanted to eat solid foods. Pt reports that she would consume with Ensure drinks in place of her meals. good/Pt reports a good appetite and PO intake PTA. Pt's typical daily intake: Breakfast- cream of wheat, oatmeal, cold cereal with milk (weekdays), pancakes, eggs, smith, sausage, juice (weekends); Lunch- peanut butter and jelly sandwich, sandwich with sliced meat, ravioli ; Dinner-baked chicken, boiled chicken, liver, kidney; Snacks- sunflower seeds, apple, potato sticks (sometimes). Pt's beverages PTA include seltzer, orange juice, and a lot of water. Pt's supplement use PTA includes Ensure about 3/week, and denies use of any Vitamins. Pt states that about a month ago her appetite began to decrease. Pt thinks that she had some type of illness such as the flu, or a bad cold which is what caused her appetite to begin to decrease. Pt states that her poor appetite persisted and she no longer wanted to eat solid foods. Pt reports that she would consume with Ensure drinks in place of her meals when she did not feel like consuming solid foods.

## 2018-01-26 NOTE — DISCHARGE NOTE ADULT - CARE PROVIDER_API CALL
Odilon Cash (), Cardiology; Internal Medicine  [address] [facility]  [address]  [place] [address]  Phone: [phone]  Fax: [phone]    Humza Davis), Infectious Disease; Internal Medicine  [address] [place] [address]  Phone: [phone]  Fax: [phone]    Korey Sterling), Neurological Surgery  [address] [place] [address]  Phone: [phone]  Fax: [phone] Odilon Cash (DO), Cardiology; Internal Medicine  [address]  [address]  [place] [address]  Phone: [phone]  Fax: [phone]    Humza Davis (MD), Infectious Disease; Internal Medicine  [address] [place] [address]  Phone: [phone]  Fax: [phone]    Korey Sterling), Neurological Surgery  [address] [place] [address]  Phone: [phone]  Fax: [phone]    Samy Fine (DO), Neurology; Vascular Neurology  [address] [facility] [address]  [place] [address]  Phone: [phone]  Fax: [phone] Odilon Cash (DO), Cardiology; Internal Medicine  [address]  [address]  [place] [address]  Phone: [phone]  Fax: [phone]    Humza Davis), Infectious Disease; Internal Medicine  [address] [place] [address]  Phone: [phone]  Fax: [phone]    Samy Fine (DO), Neurology; Vascular Neurology  [address] [facility] [address]  [place] [address]  Phone: [phone]  Fax: [phone]    Navi Rios), Hematology; Medical Oncology  [address] [facility]  [address]  [place] [address]  Phone: [phone]  Fax: [phone]

## 2018-01-26 NOTE — CHART NOTE - NSCHARTNOTEFT_GEN_A_CORE
Neurology Dr Wright gave instruction to Laly (stroke NP) over the phone- Given the risk of mycotoxin aneurysm will hold Aspirin for now.

## 2018-01-26 NOTE — DISCHARGE NOTE ADULT - PATIENT PORTAL LINK FT
“You can access the FollowHealth Patient Portal, offered by [facility], by registering with the following website: [email or web address]”

## 2018-01-26 NOTE — CONSULT NOTE ADULT - ASSESSMENT
52 year old female with PMHx of CVA x 2 ( 1993 & 2003)  presenting for outpt MVR evaluation via [facility] as recommended by neurology and cardiology found to have fever as suspected endocarditis admitted for further evaluation . Neurology consulted for management of care.   MRI done showing acute small L cerebellar infarct, and MRA showed bulbous prominence of R MCA bifurcation, further patient had CTA H/N for possible pseudoanuersym. No evidence of hemorrhage at this time.    - At this time patient will need conventional angiogram to r/o mycotic anuersym  - ID for management of endocarditis  - Neurochecks q4h 52 year old female with PMHx of CVA x 2 ( 1993 & 2003)  presenting for outpt MVR evaluation via [place] as recommended by neurology and cardiology found to have fever as suspected endocarditis admitted for further evaluation . Neurology consulted for management of care.   MRI done showing acute small L cerebellar infarct, and MRA showed bulbous prominence of R MCA bifurcation, further patient had CTA H/N for possible pseudoanuersym. No evidence of hemorrhage at this time.    - MRI Brain w/wo contrast, MRA head w/o contrast MRA Neck w/ contrast vs CTA H/N  - At this time patient will need conventional angiogram to r/o mycotic anuersym  - ID for management of endocarditis  - Neurochecks q4h

## 2018-01-26 NOTE — DISCHARGE NOTE ADULT - PROVIDER TOKENS
TOKEN:'[id number]:'[id number] TOKEN:'[id number]:'[id number]:'[id number]' TOKEN:'[id number]',TOKEN:'[id number]'

## 2018-01-26 NOTE — DISCHARGE NOTE ADULT - HOME CARE AGENCY
[phone] [facility] SPECIALITY INFUSION  FOR CARE OF PICC LINE IV ANTIBIOTIC AND CBC WEEKLY. [facility] [place]  [phone], TEACHING, ASSESSMENT. EVALUATION HOME PHYSICAL THERAPY AND HHA.

## 2018-01-26 NOTE — DIETITIAN INITIAL EVALUATION ADULT. - ADHERENCE
fair/Pt states she tried to watch what she eats, however does not strictly follow a heart healthy diet. fair/Pt states she tries to watch what she eats, however does not strictly follow a heart healthy diet. Pt also states that she is trying to increase her PO intake to help promote weight gain.

## 2018-01-26 NOTE — CONSULT NOTE ADULT - SUBJECTIVE AND OBJECTIVE BOX
History of Present Illness:  52y Female with PMHx of known MR, CVA x 2 (  & ) with left sided residual who presented for outpt MVR evaluation via [facility] as recommended by neurology and cardiology and was found to have fever as suspected endocarditis admitted for further evaluation. TTE Finding revealed: Mitral valve prolapse involving the anterior mitral leaflet with flail A2/3 segment. There is a small mobile independently mobile short linear-like echodensity at the P2 /P3 annulus which measures 0.4cm x0.3cm. This may represent a vegetation. The tips of the anterior mitral valve leaflet is thickened  and measures up to 6mm. Severe mitral regurgitation eccentric posterior mitral regurgitation.  Flow reversal is seen in the pulmonary veins. ERO by PISA is 0.67sqcm Trileaflet aortic valve with normal opening. The leaflets of the aortic valve prolapse. Mild left ventricular enlargement. Normal left ventricular systolic function. Normal right ventricular size and function.  Blood culture x 2: Gram Positive Cocci in Pairs and Chains.  Currently on IV Abx Ceftriaxone and Vancomycin, ID following. Neurology following awaiting Head CT / MRI.  CTS consult called to evaluate patient for cardiac surgery.          Past Medical History  Mitral valve regurgitation  CVA (cerebral vascular accident): 2001      Past Surgical History  H/O tubal ligation:   History of  section: 2001  No significant past surgical history      MEDICATIONS  (STANDING):  cefTRIAXone   IVPB      sodium chloride 0.9%. 1000 milliLiter(s) (75 mL/Hr) IV Continuous <Continuous>  vancomycin  IVPB 1000 milliGRAM(s) IV Intermittent every 12 hours    MEDICATIONS  (PRN):  acetaminophen   Tablet 650 milliGRAM(s) Oral every 6 hours PRN For Temp greater than 38 C (100.4 F)    Antiplatelet therapy:                           Last dose/amt:    Allergies: No Known Allergies      SOCIAL HISTORY:  Smoker: [ ] Yes  [X ] No        PACK YEARS:                         WHEN QUIT?  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   Live with: Spouse and children   Assist device use: Luis Alberto     Relevant Family History  FAMILY HISTORY:  No pertinent family history in first degree relatives      Review of Systems  GENERAL:  Fevers[x ] chills[X] sweats[] fatigue[] weight loss[] weight gain []                                        NEURO:  parathesias[] seizures []  syncope []  confusion []                                                                                  EYES: glasses[]  blurry vision[]  discharge[] pain[] glaucoma []                                                                            ENMT:  difficulty hearing []  vertigo[]  dysphagia[] epistaxis[] recent dental work []                                      CV:  chest pain[] palpitations[] LEE [] diaphoresis [] edema[]                                                                                             RESPIRATORY:  wheezing[] SOB[X] cough [] sputum[] hemoptysis[]                                                                    GI:  nausea[]  vomiting []  diarrhea[] constipation [] melena []                                                                        : hematuria[ ]  dysuria[ ] urgency[] incontinence[]                                                                                              MUSCULOSKELETAL  arthritis[ ]  joint swelling [ ] muscle weakness [X ]                                                                  SKIN/BREAST:  rash[ ] itching [ ]  hair loss[ ] masses[ ]                                                                                                PSYCH:  dementia [ ] depression [ ] anxiety[ ]                                                                                                                  HEME/LYMPH:  bruises easily[ ] enlarged lymph nodes[ ] tender lymph nodes[ ]                                                 ENDOCRINE:  cold intolerance[ ] heat intolerance[ ] polydipsia[ ]                                                                              PHYSICAL EXAM  Vital Signs Last 24 Hrs  T(C): 36.7 (2018 18:49), Max: 38 (2018 13:25)  T(F): 98 (2018 18:49), Max: 100.4 (2018 13:25)  HR: 71 (2018 17:29) (62 - 89)  BP: 101/55 (2018 17:29) (87/50 - 116/65)  BP(mean): --  RR: 18 (2018 17:29) (16 - 18)  SpO2: 99% (2018 17:29) (97% - 99%)    General: Well nourished, well developed, no acute distress.                                                         Neuro: Normal exam oriented to person/place & time left upper and lower extremety weakness.                 Eyes: Normal exam of conjunctiva & lids, pupils equally reactive.   ENT: Normal exam of nasal/oral mucosa with absence of cyanosis.   Neck: Normal exam of jugular veins, trachea & thyroid.   Chest: Normal lung exam with good air movement absence of wheezes, rales, or rhonchi:                                                                          CV:  Auscultation: normal [x ] S3[ ] S4[ ] Irregular [ ] Rub[ ] Clicks[ ]  Murmurs none:[ ]systolic [X]  diastolic [ ] holosystolic [ ]  Carotids: No Bruits[x ] Other____________ Abdominal Aorta: normal [x ] nonpalpable[ x]                                                                         GI: Normal exam of abdomen, liver & spleen with no noted masses or tenderness.  (+) BS X 4 Quadrants, Nontender / Non Distended.                                                                                             Extremities: Normal no evidence of cyanosis or deformity Edema: none[x ]trace[ ]1+[ ]2+[ ]3+[ ]4+[ ]  Lower Extremity Pulses: Right[ ] Left[ ]Varicosities[- ]  SKIN : Normal exam to inspection & palpation; (+) left foot drop                                                         LABS:                        10.8   8.7   )-----------( 209      ( 2018 22:57 )             32.1     -    135  |  100  |  11  ----------------------------<  106<H>  4.0   |  27  |  1.07    Ca    9.2      2018 22:57        Urinalysis Basic - ( 2018 22:54 )    Color: Yellow / Appearance: SL Turbid / S.018 / pH: x  Gluc: x / Ketone: Negative  / Bili: Negative / Urobili: Negative   Blood: x / Protein: Trace / Nitrite: Negative   Leuk Esterase: Negative / RBC: 0-2 /HPF / WBC 2-5 /HPF   Sq Epi: x / Non Sq Epi: Few /HPF / Bacteria: Few /HPF      Cardiac Cath:    TTE / [facility]: Mitral valve prolapse involving the anterior mitral leaflet with flail A2/3 segment. There is a small mobile independently mobile short linear-like echodensity at the P2 /P3 annulus which measures 0.4cm x0.3cm. This may represent a vegetation. The tips of the anterior mitral valve leaflet is thickened  and measures up to 6mm. Severe mitral regurgitation eccentric posterior mitral regurgitation.  Flow reversal is seen in the pulmonary veins. ERO by PISA is 0.67sqcm Trileaflet aortic valve with normal opening. The leaflets of the aortic valve prolapse. Mild left ventricular enlargement. Normal left ventricular systolic function. Normal right ventricular size and function.

## 2018-01-26 NOTE — DISCHARGE NOTE ADULT - MEDICATION SUMMARY - MEDICATIONS TO TAKE
I will START or STAY ON the medications listed below when I get home from the hospital:    CBC Weekly  -- Send results to Dr. Davis  -- Indication: For On IV Antibiotics    gentamicin 120 mg/100 mL-NaCl 0.9% intravenous solution  -- 140 milligram(s) intravenously once a day   UNTIL 2/10/18  -- Indication: For Endocarditis    acetaminophen 325 mg oral tablet  -- 2 tab(s) by mouth every 6 hours, As needed, Mild Pain (1 - 3)  -- Indication: For pain    cefTRIAXone 2 g/50 mL intravenous solution  -- 2 gram(s) intravenously once a day   UNTIL 2/23/18  -- Indication: For Endocarditis

## 2018-01-26 NOTE — DISCHARGE NOTE ADULT - CARE PROVIDERS DIRECT ADDRESSES
,DirectAddress_Unknown,[email or web address],DirectAddress_Unknown ,DirectAddress_Unknown,[email or web address],DirectAddress_Unknown,DirectAddress_Unknown

## 2018-01-26 NOTE — PROGRESS NOTE ADULT - ASSESSMENT
53 y/o female with prior h/o CVA (x2), both remote presented with fever to echo lab for [facility], found to have severe MR with evidence of vegetation and portions of valve flailing.  Other than fevers, patient has no limiting symptoms.    Admit to medicine for evaluation by ID (complete), Neurology (complete).  Await CT surgery input after review of [facility].  IV Abx likely with PICC line after culture results are known or after bacteremia (if true) clears.  CTA ordered to r/o mycotic aneurysms.

## 2018-01-26 NOTE — CONSULT NOTE ADULT - ATTENDING COMMENTS
VASCULAR NEUROLOGY ATTENDING  The patient is seen and examined the history and imaging are reviewed. I agree with the resident note unless otherwise noted. Patient with remote history of stroke and recent admission to [facility] for progressive weight loss, and MRI/A concerning for SAH. Now with noted AUSTIN findings and concern for endocarditis. Patient known to Dr. Korey Sterling of neurosurgery. Exam is at her neurologic baseline. 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 Will need MRI brain w/wo contrast and MRA with contrast or CTA head. Will need conventional angiogram prior to any possible valve repair. Timing and need to be determined by cardiology CT surgery and ID. VASCULAR NEUROLOGY ATTENDING  The patient is seen and examined the history and imaging are reviewed. I agree with the resident note unless otherwise noted. Patient with remote history of stroke and recent admission to [facility] for progressive weight loss, and MRI/A concerning for SAH. Now with noted AUSTIN findings and concern for endocarditis. Patient known to Dr. Korey Sterling of neurosurgery. Exam is at her neurologic baseline. 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 Will need MRI brain w/wo contrast and MRA with contrast or CTA head. Will need conventional angiogram prior to any possible valve repair. Timing and need to be determined by cardiology CT surgery and ID. Given the overall clinical picture and concern for mycotic aneurysm and recent bleeding would hold ASA for now until brain imaging is obtained.

## 2018-01-26 NOTE — DIETITIAN INITIAL EVALUATION ADULT. - NS AS NUTRI INTERV ED CONTENT
Recommended modifications/Pt made aware of importance of good energy and protein intake. Pt encouraged to consume small frequent meals of nutrient dense foods./Purpose of the nutrition education

## 2018-01-26 NOTE — DIETITIAN INITIAL EVALUATION ADULT. - NS AS NUTRI INTERV MEALS SNACK
Protein - modified diet/Continue with current DASH/TLC diet./Energy - modified diet/General/healthful diet

## 2018-01-26 NOTE — DISCHARGE NOTE ADULT - HOSPITAL COURSE
52 yr with hx of two cva yrs ago, (unexplained) mitral insufficiency, recently admitted after outpt AUSTIN demonstrated possible vegetation and she was found to have fever 102 at that time. Denies previously knowing that she was febrile .  No recent dental work but has lost 20 lbs of weight unintentionally.   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   Plan is to continue with Ceftriaxone 2 gm iv q24h until 2/23/18,  stopped Vancomycin and added Gentamicin 140mg IV daily until 2/10/18 via PICC with Weekly CBC monitoring  PICC placed on 1/30/18 and home care plans for discharge on IV antibiotics with follow up with ID - Dr. Davis     Neurology consulted for patient with remote history of stroke and MRI/A concerning for SAH. Patient known to Dr. Korey Sterling of neurosurgery. Exam is at her neurologic baseline. 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   MRI Head without contrast on 2/27/18 - Compared with prior MR new punctate infarct in the left cerebellum inferior to previously identified focus on the prior MR. More conspicuous areas of subarachnoid hemorrhage in the right sylvian fissure region as well as in the right precentral sulcus region more conspicuous compared with the prior MR study though not well identified on the   patient's prior CT 1/26/2018. There are old infarcts in the right basal ganglia region as well as microvascular ischemic changes noted. However patchy foci of signal hyperintensity in the right periventricular region as seen previously are somewhat different in appearance of raise the possibility of demyelinating pathology is well..    Hematology evaluated for history of CVA, but no clear etiology.  Needs a hypercoagulable evaluation - LAbs performed while in the hospital - Needs to be followed up as outpatient by Hematologist 52 yr with hx of two cva yrs ago, (unexplained) mitral insufficiency, recently admitted after outpt AUSTIN demonstrated possible vegetation and she was found to have fever 102 at that time. Denies previously knowing that she was febrile .  No recent dental work but has lost 20 lbs of weight unintentionally.   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   Plan is to continue with Ceftriaxone 2 gm iv q24h until 2/23/18,  stopped Vancomycin and added Gentamicin 140mg IV daily until 2/10/18 via PICC with Weekly CBC monitoring  PICC placed on 1/30/18 and home care plans for discharge on IV antibiotics with follow up with ID - Dr. Davis     Neurology consulted for patient with remote history of stroke and MRI/A concerning for SAH. Patient known to Dr. Korey Sterling of neurosurgery. Exam is at her neurologic baseline. 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   MRI Head without contrast on 2/27/18 - Compared with prior MR new punctate infarct in the left cerebellum inferior to previously identified focus on the prior MR. More conspicuous areas of subarachnoid hemorrhage in the right sylvian fissure region as well as in the right precentral sulcus region more conspicuous compared with the prior MR study though not well identified on the   patient's prior CT 1/26/2018. There are old infarcts in the right basal ganglia region as well as microvascular ischemic changes noted. However patchy foci of signal hyperintensity in the right periventricular region as seen previously are somewhat different in appearance of raise the possibility of demyelinating pathology is well.  Plan is to repeat AUSTIN as outpatient and if patient requires CTS  will need conventional angiogram prior to any possible valve repair. Timing and need to be determined by cardiology CT surgery and ID. Given the overall clinical picture and concern for mycotic aneurysm and recent bleeding would hold ASA for now until brain imaging is obtained.   Dr. Fine will decide on restarting ASA as outpatient    Hematology evaluated for history of CVA, but no clear etiology.  Needs a hypercoagulable evaluation - LAbs performed while in the hospital - Needs to be followed up as outpatient by Hematologist 52 yr with hx of two cva yrs ago, (unexplained) mitral insufficiency, recently admitted after outpt AUSTIN demonstrated possible vegetation and she was found to have fever 102 at that time. Denies previously knowing that she was febrile .  No recent dental work but has lost 20 lbs of weight unintentionally.   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   Plan is to continue with Ceftriaxone 2 gm iv q24h until 2/23/18,  stopped Vancomycin and added Gentamicin 140mg IV daily until 2/10/18 via PICC with Weekly CBC monitoring  PICC placed on 1/30/18 and home care plans for discharge on IV antibiotics with follow up with ID - Dr. Davis     Neurology consulted for patient with remote history of stroke and MRI/A concerning for SAH. Patient known to Dr. Korey Sterling of neurosurgery. Exam is at her neurologic baseline. 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   MRI Head without contrast on 2/27/18 - Compared with prior MR new punctate infarct in the left cerebellum inferior to previously identified focus on the prior MR. More conspicuous areas of subarachnoid hemorrhage in the right sylvian fissure region as well as in the right precentral sulcus region more conspicuous compared with the prior MR study though not well identified on the   patient's prior CT 1/26/2018. There are old infarcts in the right basal ganglia region as well as microvascular ischemic changes noted. However patchy foci of signal hyperintensity in the right periventricular region as seen previously are somewhat different in appearance of raise the possibility of demyelinating pathology is well.  Plan is to repeat AUSTIN as outpatient and if patient requires CTS  will need conventional angiogram prior to any possible valve repair. Timing and need to be determined by cardiology CT surgery and ID. Given the overall clinical picture and concern for mycotic aneurysm and recent bleeding would hold ASA for now.   Dr. Fine will decide on restarting ASA as outpatient    Hematology evaluated for history of CVA, but no clear etiology.  Needs a hypercoagulable evaluation - Labs performed while in the hospital - Needs to be followed up as outpatient by Hematologist

## 2018-01-26 NOTE — DIETITIAN INITIAL EVALUATION ADULT. - PHYSICAL APPEARANCE
Pt agreeable to Nutrition Focused Physical Exam. Nutrition focused physical exam preformed with RD. Pt with mild muscle loss in temples, however well-nourished muscle stores in clavicle, shoulders, thighs, calfs; pt with well-nourished fat stores in orbitals triceps, and ribs./well nourished

## 2018-01-26 NOTE — DIETITIAN INITIAL EVALUATION ADULT. - NS AS NUTRI INTERV MEDICAL AND FOOD SUPPLEMENTS
Commercial beverage/Pt offered Ensure Enlive, pt declined stating she would like to try gaining back her weight with whole foods. Pt made aware that Ensure Enlive supplement is available if she feels she needs it.

## 2018-01-26 NOTE — CONSULT NOTE ADULT - SUBJECTIVE AND OBJECTIVE BOX
Patient is a 52y old  Female who presents with a chief complaint of   HPI:  52 year old female with  PMHx of CVA x 2 (  & )  presenting for outpt MVR evaluation via AUSTIN as recommended by neurology and cardiology found to have fever as suspected endocarditis admitted for further evaluation . (2018 18:49)      PAST MEDICAL & SURGICAL HISTORY:  Mitral valve regurgitation  CVA (cerebral vascular accident): 2001  H/O tubal ligation:   History of  section: 2001      Social history:    FAMILY HISTORY:  No pertinent family history in first degree relatives    REVIEW OF SYSTEMS  General:	Denies any malaise fatigue or chills. Fevers absent    Skin:No rash  	  Ophthalmologic:Denies any visual complaints,discharge redness or photophobia  	  ENMT:No nasal discharge,headache,sinus congestion or throat pain.No dental complaints    Respiratory and Thorax:No cough,sputum or chest pain.Denies shortness of breath  	  Cardiovascular:	No chest pain,palpitaions or dizziness    Gastrointestinal:	NO nausea,abdominal pain or diarrhea.    Genitourinary:	No dysuria,frequency. No flank pain    Musculoskeletal:	 muscle pain          Psychiatric:No delusions or hallucinations	    Hematology/Lymphatics:	No LN swelling.No gum bleeding     Endocrine:	No recent weight gain or loss.No abnormal heat/cold intolerance    Allergic/Immunologic:	No hives or rash   Allergies    No Known Allergies    Intolerances        Antimicrobials:          Vital Signs Last 24 Hrs  T(C): 36.8 (2018 05:40), Max: 36.9 (2018 18:30)  T(F): 98.2 (2018 05:40), Max: 98.4 (2018 18:30)  HR: 83 (2018 05:40) (62 - 83)  BP: 106/48 (2018 05:40) (87/50 - 106/48)  BP(mean): 71 (2018 18:49) (71 - 71)  RR: 17 (2018 05:40) (13 - 17)  SpO2: 97% (2018 05:40) (97% - 99%)    PHYSICAL EXAM:Pleasant patient in no acute distress.      Constitutional:Comfortable.Awake and alert  No cachexia     Eyes:PERRL EOMI.NO discharge or conjunctival injection    ENMT:No sinus tenderness.No thrush.No pharyngeal exudate or erythema.Fair dental hygiene    Neck:Supple,No LN,no JVD      Respiratory:Good air entry bilaterally,CTA     no signs of endocardiits   Gastrointestinal:Soft BS(+) no tenderness no masses ,No rebound or guarding    Genitourinary:No CVA tendereness     Rectal:    Extremities:No cyanosis,clubbing or edema.    Vascular:peripheral pulses felt    Neurological:AAO X 3,No grossly focal deficits    Skin:No rash     Lymph Nodes:No palpable LNs    Musculoskeletal:No joint swelling or LOM    Psychiatric:Affect normal.                                10.8   8.7   )-----------( 209      ( 2018 22:57 )             32.1             135  |  100  |  11  ----------------------------<  106<H>  4.0   |  27  |  1.07    Ca    9.2      2018 22:57        RECENT CULTURES:      MICROBIOLOGY:          Radiology:      Assessment:        Recommendations and Plan:    Pager [phone]  After 5 pm/weekends or if no response :[phone]

## 2018-01-26 NOTE — DISCHARGE NOTE ADULT - ADDITIONAL INSTRUCTIONS
Follow up with Cardiology - Dr. Cash on 2/5/18  Follow up with Dr. Davis on 1 week - call for appointment  Follow up with NeuroSx - Dr. Korey Sterling in 1 week - call for appointment Follow up with Cardiology - Dr. Cash on 2/5/18  Follow up with Dr. Davis on 1 week - call for appointment  Follow up with neurology - Dr. Fine next week - Dr. Fine will decide on restarting ASA as outpatient  Follow up with NeuroSurgery - Dr. Korey Sterling in 1 week - call for appointment Follow up with Cardiology - Dr. Cash on 2/5/18  Follow up with Dr. Davis on 1 week - call for appointment  Follow up with neurology - Dr. Fine next week - Dr. Fine will decide on restarting ASA as outpatient  Follow up with  Hematologist - Dr. Rios in 1 week for hypercoagulable evaluation  Follow up with NeuroSurgery - Dr. Korey Sterling in 1 week - call for appointment

## 2018-01-27 LAB
GRAM STN FLD: SIGNIFICANT CHANGE UP
HBA1C BLD-MCNC: 5.6 % — SIGNIFICANT CHANGE UP (ref 4–5.6)
LDLC SERPL DIRECT ASSAY-MCNC: 87 MG/DL — SIGNIFICANT CHANGE UP
SPECIMEN SOURCE: SIGNIFICANT CHANGE UP

## 2018-01-27 PROCEDURE — 99232 SBSQ HOSP IP/OBS MODERATE 35: CPT

## 2018-01-27 PROCEDURE — 70551 MRI BRAIN STEM W/O DYE: CPT | Mod: 26

## 2018-01-27 PROCEDURE — 74177 CT ABD & PELVIS W/CONTRAST: CPT | Mod: 26

## 2018-01-27 RX ADMIN — SODIUM CHLORIDE 75 MILLILITER(S): 9 INJECTION INTRAMUSCULAR; INTRAVENOUS; SUBCUTANEOUS at 21:35

## 2018-01-27 RX ADMIN — CEFTRIAXONE 100 GRAM(S): 500 INJECTION, POWDER, FOR SOLUTION INTRAMUSCULAR; INTRAVENOUS at 09:30

## 2018-01-27 RX ADMIN — SODIUM CHLORIDE 75 MILLILITER(S): 9 INJECTION INTRAMUSCULAR; INTRAVENOUS; SUBCUTANEOUS at 18:38

## 2018-01-27 NOTE — PROGRESS NOTE ADULT - ASSESSMENT
52 yr with hx of two cva yrs ago, ( unexplained) mitral insufficiency, recently admitted after outpt AUSTIN demonstrated possible vegetation and she was found to have fever 102 at that time. Denies previously knowing that she was febrile .  No recent dental work but has lost 20 lbs of weight unintentionally.  no travel.   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     Plan:  Continue with Ceftriaxone 2 gm iv q24h  Stopped Vancomycin  Follow up final identification and sensitivity of Step  Repeat blood cultures in am, ordered.   Need Ct abd/pelvis to r/o abdominal source.    CTA to ro mycotic aneurysm ordered.

## 2018-01-27 NOTE — PROVIDER CONTACT NOTE (CRITICAL VALUE NOTIFICATION) - SITUATION
Critical value reported by lab for blood cultures drawn on 1/26/18 @ 11:33, growth in aerobic and anaerobic bottles: gram positive cocci in pairs and chains
Blood cx from 1/25/18 - growth in anarobic bottle-gram positive cocci in pairs and chains  Blood cx from 1/25/18 - growth in anarobic bottle-gram positive cocci in pairs and chains

## 2018-01-27 NOTE — PROGRESS NOTE ADULT - SUBJECTIVE AND OBJECTIVE BOX
Subjective: Patient seen and examined.     No new complaints    MEDICATIONS  (STANDING):  cefTRIAXone   IVPB      cefTRIAXone   IVPB 2 Gram(s) IV Intermittent every 24 hours  sodium chloride 0.9%. 1000 milliLiter(s) (75 mL/Hr) IV Continuous <Continuous>    MEDICATIONS  (PRN):  acetaminophen   Tablet 650 milliGRAM(s) Oral every 6 hours PRN For Temp greater than 38 C (100.4 F)    PHYSICAL EXAM:  Vital Signs Last 24 Hrs  T(C): 37.1 (27 Jan 2018 04:49), Max: 37.8 (26 Jan 2018 17:40)  T(F): 98.8 (27 Jan 2018 04:49), Max: 100.1 (26 Jan 2018 17:40)  HR: 75 (27 Jan 2018 04:49) (71 - 75)  BP: 102/56 (27 Jan 2018 04:49) (101/55 - 109/57)  BP(mean): --  RR: 18 (27 Jan 2018 04:49) (18 - 18)  SpO2: 100% (27 Jan 2018 04:49) (99% - 100%)    Tele - NSR 60-70s    Appearance: Normal, NAD	  HEENT:   Normal oral mucosa, PERRL, EOMI	  Lymphatic: No lymphadenopathy , no edema  Cardiovascular: Normal S1 S2, No JVD, III/VI systolic murmur ,  Respiratory: Lungs clear to auscultation, normal effort 	  Gastrointestinal:  Soft, Non-tender, + BS	  Skin: No rashes, No ecchymoses, No cyanosis, warm to touch  Neuro: LUE weakness  Psychiatry:  Mood & affect appropriate  Ext: No edema      LABS:                        10.8   8.7   )-----------( 209      ( 25 Jan 2018 22:57 )             32.1   01-25    135  |  100  |  11  ----------------------------<  106<H>  4.0   |  27  |  1.07    Ca    9.2      25 Jan 2018 22:57      ECG:  	      RADIOLOGY:   CT Angio Head w/ IV Cont (01.26.18 @ 22:19) >  IMPRESSION:    Brain CT:  Chronic right corona radiata infarct. No acute intracranial hemorrhage or   mass effect. Resolution of hemorrhage in the left periatrial region as   seen on the prior 1/1/2018    Brain CTA:  Slightly limited due to phase of contrast. Previously described abnormal   foci of enhancement in the left parieto-occipital lobe are not visualized.        DIAGNOSTIC TESTING:    Echocardiogram:  AUSTIN w/TTE (w/3D Echo) (01.25.18 @ 14:15) >  Conclusions:  1. Mitral valve prolapse involving the anterior mitral  leaflet with flail A2/3 segment. There is a small mobile  independently mobile short linear-like echodensity at the  P2 /P3 annulus which measures 0.4cm x0.3cm. This may  represent a vegetation. The tips of the anterior mitral  valve leaflet is thickened  and measures up to 6mm. Severe  mitral regurgitation eccentric posterior mitral  regurgitation.  Flow reversal is seen in the pulmonary  veins. ERO by PISA is 0.67sqcm  2. Trileaflet aortic valve with normal opening. The  leaflets of the aortic valve prolapse.  3. Mild left ventricular enlargement.  4. Normal left ventricular systolic function.  5. Normal right ventricular size and function.  *** No previous Echo exam.  Findings discussed with Dr. Odilon Cash at time of AUSTIN.

## 2018-01-27 NOTE — PROGRESS NOTE ADULT - ASSESSMENT
51 y/o female with prior h/o CVA (x2 - both remote) presented with fever for AUSTIN, found to have severe MR with evidence of vegetation and portions of valve flailing.  No limiting symptoms.  In retrospect, pt may have been experiencing low-grade fevers associated with chills at home past few weeks.    Evaluation by ID, Neurology.  Blood cultures positive (6/6) - await ID/sens.  CT surgery input noted. No planned surgery at this time.   Await completion of neuro work-up  IV Abx likely with PICC line after culture results are known or after bacteremia (if true) clears.  CTA without evidence of mycotic aneurysms, + evidence of prior CVA

## 2018-01-27 NOTE — PROGRESS NOTE ADULT - SUBJECTIVE AND OBJECTIVE BOX
52y old  Female who presents with a chief complaint of endocarditis     Interval history:  Low grade temp overnight, no cough, no abdominal pain.       Antimicrobials:   cefTRIAXone   IVPB 2 Gram(s) IV Intermittent every 24 hours    REVIEW OF SYSTEMS:    No chest pain   No cough, no SOB  No N/V/D  No dysuria or frequency  No rash.     Vital Signs Last 24 Hrs  T(C): 37.1 (01-27-18 @ 04:49), Max: 38 (01-26-18 @ 13:25)  T(F): 98.8 (01-27-18 @ 04:49), Max: 100.4 (01-26-18 @ 13:25)  HR: 75 (01-27-18 @ 04:49) (71 - 82)  BP: 102/56 (01-27-18 @ 04:49) (101/55 - 109/57)  RR: 18 (01-27-18 @ 04:49) (18 - 18)  SpO2: 100% (01-27-18 @ 04:49) (99% - 100%)    PHYSICAL EXAM:  Patient in no acute distress. AAOX3.  No icterus, no oral ulcers.  Cardiovascular: S1S2 normal. + murmur.   Lungs: Good air entry B/L lung fields.  Gastrointestinal: soft, nontender, nondistended.  Extremities: no edema.  IV sites not inflamed.                           10.8   8.7   )-----------( 209      ( 25 Jan 2018 22:57 )             32.1   01-25    135  |  100  |  11  ----------------------------<  106<H>  4.0   |  27  |  1.07    Ca    9.2      25 Jan 2018 22:57      Culture - Blood (collected 26 Jan 2018 11:33)  Source: .Blood Blood  Preliminary Report (27 Jan 2018 05:58):    Growth in aerobic and anaerobic bottles:    Gram Positive Cocci in Pairs and Chains    Culture - Blood (collected 26 Jan 2018 00:49)  Source: .Blood  Preliminary Report (26 Jan 2018 23:13):    Growth in aerobic and anaerobic bottles: Gram Positive Cocci in Pairs and    Chains    Culture - Blood (collected 26 Jan 2018 00:49)  Source: .Blood  Preliminary Report (26 Jan 2018 17:38):    Growth in anaerobic bottle: Gram Positive Cocci in Pairs and Chains      Culture - Urine (collected 26 Jan 2018 00:48)  Source: .Urine Clean Catch (Midstream)  Final Report (26 Jan 2018 19:00):    10,000 - 49,000 CFU/mL Coag Negative Staphylococcus

## 2018-01-27 NOTE — CHART NOTE - NSCHARTNOTEFT_GEN_A_CORE
Interval events  Blood culture done on 1/26 resulted with gram positive cocci in pairs and chains in aerobic and anaerobic bottles.     Continue vancomycin  F/U bCX final results  Will continue to monitor    Matiled Campo [facility]  Spectra#[id number]

## 2018-01-28 LAB
ANION GAP SERPL CALC-SCNC: 15 MMOL/L — SIGNIFICANT CHANGE UP (ref 5–17)
BUN SERPL-MCNC: 8 MG/DL — SIGNIFICANT CHANGE UP (ref 7–23)
CALCIUM SERPL-MCNC: 9.5 MG/DL — SIGNIFICANT CHANGE UP (ref 8.4–10.5)
CHLORIDE SERPL-SCNC: 103 MMOL/L — SIGNIFICANT CHANGE UP (ref 96–108)
CO2 SERPL-SCNC: 22 MMOL/L — SIGNIFICANT CHANGE UP (ref 22–31)
CREAT SERPL-MCNC: 0.91 MG/DL — SIGNIFICANT CHANGE UP (ref 0.5–1.3)
GLUCOSE SERPL-MCNC: 86 MG/DL — SIGNIFICANT CHANGE UP (ref 70–99)
HCT VFR BLD CALC: 31.2 % — LOW (ref 34.5–45)
HGB BLD-MCNC: 9.5 G/DL — LOW (ref 11.5–15.5)
MCHC RBC-ENTMCNC: 27.1 PG — SIGNIFICANT CHANGE UP (ref 27–34)
MCHC RBC-ENTMCNC: 30.4 GM/DL — LOW (ref 32–36)
MCV RBC AUTO: 88.9 FL — SIGNIFICANT CHANGE UP (ref 80–100)
PLATELET # BLD AUTO: 263 K/UL — SIGNIFICANT CHANGE UP (ref 150–400)
POTASSIUM SERPL-MCNC: 4.3 MMOL/L — SIGNIFICANT CHANGE UP (ref 3.5–5.3)
POTASSIUM SERPL-SCNC: 4.3 MMOL/L — SIGNIFICANT CHANGE UP (ref 3.5–5.3)
RBC # BLD: 3.51 M/UL — LOW (ref 3.8–5.2)
RBC # FLD: 16.4 % — HIGH (ref 10.3–14.5)
SODIUM SERPL-SCNC: 140 MMOL/L — SIGNIFICANT CHANGE UP (ref 135–145)
WBC # BLD: 8.91 K/UL — SIGNIFICANT CHANGE UP (ref 3.8–10.5)
WBC # FLD AUTO: 8.91 K/UL — SIGNIFICANT CHANGE UP (ref 3.8–10.5)

## 2018-01-28 RX ADMIN — SODIUM CHLORIDE 75 MILLILITER(S): 9 INJECTION INTRAMUSCULAR; INTRAVENOUS; SUBCUTANEOUS at 08:24

## 2018-01-28 RX ADMIN — CEFTRIAXONE 100 GRAM(S): 500 INJECTION, POWDER, FOR SOLUTION INTRAMUSCULAR; INTRAVENOUS at 08:24

## 2018-01-28 NOTE — CONSULT NOTE ADULT - SUBJECTIVE AND OBJECTIVE BOX
Chief Complaint:  Patient is a 52y old  Female who presents with a chief complaint of fevers.    HPI:  Patient is a 52-year-old female with history of CVA.  She had seen Dr. Rios a few weeks ago and was to have a hypercoagulable evaluation, but due to her vasculature they could not collect her labs.  She is now admitted with fevers and has endocarditis and she is being treated with antibiotics.  She also has anemia.  She does feel okay at this time and offers no complaints.  She is eating well.  She is ambulating without difficulty.    Medications:  acetaminophen   Tablet 650 milliGRAM(s) Oral every 6 hours PRN  cefTRIAXone   IVPB      cefTRIAXone   IVPB 2 Gram(s) IV Intermittent every 24 hours  sodium chloride 0.9%. 1000 milliLiter(s) IV Continuous <Continuous>        Allergies:  No Known Allergies    FAMILY HISTORY:  No pertinent family history in first degree relatives    PAST MEDICAL & SURGICAL HISTORY:  Mitral valve regurgitation  CVA (cerebral vascular accident): 2001  H/O tubal ligation:   History of  section: 2001    REVIEW OF SYSTEMS      General:  Denies fevers, chills, sweats.  She had lost weight prior to admission.	    Skin/Breast: denies rash, bruising, and pruritis.  	  Ophthalmologic: denies vision changes.  	  ENMT:	Denies [place], earaches, nosebleeds, sore throat.    Respiratory and Thorax: Denies SOB, cough, wheeze, hemoptysis.  	  Cardiovascular:	denies chest pain and palpitations.      Gastrointestinal:	Denies dysphagia, N/V/D/C, abd pain, BRBPR.    Genitourinary: denies dysuria, hematuria.    Musculoskeletal:	 denies leg pain, back pain, and swelling.    Neurological: denies HA, dizziness, numbness, and tingling.    Vitals:  Vital Signs Last 24 Hrs  T(C): 36.9 (2018 04:33), Max: 37.1 (2018 20:26)  T(F): 98.4 (2018 04:33), Max: 98.7 (2018 20:26)  HR: 76 (2018 04:33) (76 - 76)  BP: 117/58 (2018 04:33) (117/58 - 150/71)  BP(mean): --  RR: 18 (2018 04:33) (18 - 18)  SpO2: 100% (2018 04:33) (100% - 100%)    Pex:  alert NAD  EOMI anicteric sclera  Neck Supple No LNA  Cv positive RRR  Lungs clear B/L  abd soft NT ND +BS  No LE edema or tenderness    Labs:                        9.5    8.91  )-----------( 263      ( 2018 10:12 )             31.2     CBC Full  -  ( 2018 10:12 )  WBC Count : 8.91 K/uL  Hemoglobin : 9.5 g/dL  Hematocrit : 31.2 %  Platelet Count - Automated : 263 K/uL  Mean Cell Volume : 88.9 fl  Mean Cell Hemoglobin : 27.1 pg  Mean Cell Hemoglobin Concentration : 30.4 gm/dL  Auto Neutrophil # : x  Auto Lymphocyte # : x  Auto Monocyte # : x  Auto Eosinophil # : x  Auto Basophil # : x  Auto Neutrophil % : x  Auto Lymphocyte % : x  Auto Monocyte % : x  Auto Eosinophil % : x  Auto Basophil % : x        140  |  103  |  8   ----------------------------<  86  4.3   |  22  |  0.91    Ca    9.5      2018 10:12        [id number]

## 2018-01-28 NOTE — CONSULT NOTE ADULT - ASSESSMENT
Patient with history of CVA, but no clear etiology.  Needs a hypercoagulable evaluation, but due to poor venous access has not had it.  Would send AT III, B2G screen, anti-cardiolipin antibodies, homocysteine, protein c and S, factor V leiden, prothrombin gene mutation, and DRVVT.  Patient requests to not order labs until she has the Pic line placed, which is fine.    She does have a normocytic anemia likely due to infection.  However, would send anemia work-up as well once Pic line is placed.  Send iron, TIBC, ferritin, B12, folate, LDH, haptoglobin, and serum immunofixation.  For now the Hgb is adequate and would monitor.

## 2018-01-28 NOTE — PROGRESS NOTE ADULT - SUBJECTIVE AND OBJECTIVE BOX
Subjective: Patient seen and examined. No new events except as noted.     REVIEW OF SYSTEMS:    CONSTITUTIONAL: No weakness, fevers or chills  EYES/ENT: No visual changes;  No vertigo or throat pain   NECK: No pain or stiffness  RESPIRATORY: No cough, wheezing, hemoptysis; No shortness of breath  CARDIOVASCULAR: No chest pain or palpitations  GASTROINTESTINAL: No abdominal or epigastric pain. No nausea, vomiting, or hematemesis; No diarrhea or constipation. No melena or hematochezia.  GENITOURINARY: No dysuria, frequency or hematuria  NEUROLOGICAL: No numbness or weakness  SKIN: No itching, burning, rashes, or lesions   All other review of systems is negative unless indicated above.    MEDICATIONS:  MEDICATIONS  (STANDING):  cefTRIAXone   IVPB      cefTRIAXone   IVPB 2 Gram(s) IV Intermittent every 24 hours  sodium chloride 0.9%. 1000 milliLiter(s) (75 mL/Hr) IV Continuous <Continuous>      PHYSICAL EXAM:  T(C): 36.9 (01-28-18 @ 04:33), Max: 37.1 (01-27-18 @ 20:26)  HR: 76 (01-28-18 @ 04:33) (76 - 76)  BP: 117/58 (01-28-18 @ 04:33) (117/58 - 150/71)  RR: 18 (01-28-18 @ 04:33) (18 - 18)  SpO2: 100% (01-28-18 @ 04:33) (100% - 100%)  Wt(kg): --  I&O's Summary    27 Jan 2018 07:01  -  28 Jan 2018 07:00  --------------------------------------------------------  IN: 900 mL / OUT: 0 mL / NET: 900 mL    28 Jan 2018 07:01  -  28 Jan 2018 15:13  --------------------------------------------------------  IN: 275 mL / OUT: 0 mL / NET: 275 mL          Appearance: Normal	  HEENT:   Normal oral mucosa, PERRL, EOMI	  Lymphatic: No lymphadenopathy , no edema  Cardiovascular: Normal S1 S2, No JVD, 3/4  murmurs , Peripheral pulses palpable 2+ bilaterally  Respiratory: Lungs clear to auscultation, normal effort 	  Gastrointestinal:  Soft, Non-tender, + BS	  Skin: No rashes, No ecchymoses, No cyanosis, warm to touch  Musculoskeletal: Normal range of motion, normal strength  Psychiatry:  Mood & affect appropriate  Ext: No edema      LABS:    CARDIAC MARKERS:                                9.5    8.91  )-----------( 263      ( 28 Jan 2018 10:12 )             31.2     01-28    140  |  103  |  8   ----------------------------<  86  4.3   |  22  |  0.91    Ca    9.5      28 Jan 2018 10:12      proBNP:   Lipid Profile:   HgA1c:   TSH:     Negative          TELEMETRY: 	 SR   ECG:  	  RADIOLOGY:   DIAGNOSTIC TESTING:  [ ] Echocardiogram:  [ ]  Catheterization:  [ ] Stress Test:    OTHER:

## 2018-01-29 LAB
CULTURE RESULTS: SIGNIFICANT CHANGE UP
HCT VFR BLD CALC: 29.9 % — LOW (ref 34.5–45)
HGB BLD-MCNC: 9.3 G/DL — LOW (ref 11.5–15.5)
MCHC RBC-ENTMCNC: 27.9 PG — SIGNIFICANT CHANGE UP (ref 27–34)
MCHC RBC-ENTMCNC: 31.1 GM/DL — LOW (ref 32–36)
MCV RBC AUTO: 89.8 FL — SIGNIFICANT CHANGE UP (ref 80–100)
PLATELET # BLD AUTO: 258 K/UL — SIGNIFICANT CHANGE UP (ref 150–400)
RBC # BLD: 3.33 M/UL — LOW (ref 3.8–5.2)
RBC # FLD: 16.3 % — HIGH (ref 10.3–14.5)
SPECIMEN SOURCE: SIGNIFICANT CHANGE UP
WBC # BLD: 8.41 K/UL — SIGNIFICANT CHANGE UP (ref 3.8–10.5)
WBC # FLD AUTO: 8.41 K/UL — SIGNIFICANT CHANGE UP (ref 3.8–10.5)

## 2018-01-29 PROCEDURE — 99232 SBSQ HOSP IP/OBS MODERATE 35: CPT

## 2018-01-29 RX ORDER — GENTAMICIN SULFATE 40 MG/ML
140 VIAL (ML) INJECTION ONCE
Qty: 0 | Refills: 0 | Status: COMPLETED | OUTPATIENT
Start: 2018-01-29 | End: 2018-01-29

## 2018-01-29 RX ORDER — GENTAMICIN SULFATE 40 MG/ML
VIAL (ML) INJECTION
Qty: 0 | Refills: 0 | Status: DISCONTINUED | OUTPATIENT
Start: 2018-01-29 | End: 2018-01-31

## 2018-01-29 RX ORDER — GENTAMICIN SULFATE 40 MG/ML
140 VIAL (ML) INJECTION EVERY 24 HOURS
Qty: 0 | Refills: 0 | Status: DISCONTINUED | OUTPATIENT
Start: 2018-01-30 | End: 2018-01-31

## 2018-01-29 RX ADMIN — CEFTRIAXONE 100 GRAM(S): 500 INJECTION, POWDER, FOR SOLUTION INTRAMUSCULAR; INTRAVENOUS at 08:36

## 2018-01-29 RX ADMIN — Medication 200 MILLIGRAM(S): at 11:03

## 2018-01-29 NOTE — PROGRESS NOTE ADULT - SUBJECTIVE AND OBJECTIVE BOX
Overall feels better.  eating well without N/V.  No CP, or SOB.  No fevers or chills.    Medication:   acetaminophen   Tablet 650 milliGRAM(s) Oral every 6 hours PRN  cefTRIAXone   IVPB      cefTRIAXone   IVPB 2 Gram(s) IV Intermittent every 24 hours  gentamicin   IVPB      sodium chloride 0.9%. 1000 milliLiter(s) IV Continuous <Continuous>      Physical exam    T(C): 36.7 (01-29-18 @ 14:44), Max: 37 (01-28-18 @ 20:59)  HR: 76 (01-29-18 @ 14:44) (74 - 81)  BP: 111/64 (01-29-18 @ 14:44) (108/66 - 119/74)  RR: 18 (01-29-18 @ 14:44) (18 - 18)  SpO2: 96% (01-29-18 @ 14:44) (96% - 100%)  Wt(kg): --    alert NAD  EOMI anicteric sclera  Cv RRR  No LE edema or tenderness    Labs                        9.3    8.41  )-----------( 258      ( 29 Jan 2018 07:26 )             29.9       01-28    140  |  103  |  8   ----------------------------<  86  4.3   |  22  |  0.91    Ca    9.5      28 Jan 2018 10:12            [id number]

## 2018-01-29 NOTE — PROGRESS NOTE ADULT - ASSESSMENT
51 y/o female with prior h/o CVA (x2 - both remote) presented with fever for [place], found to have severe MR with evidence of vegetation and portions of valve flailing.  No limiting symptoms.  In retrospect, pt may have been experiencing low-grade fevers associated with chills at home past few weeks.    Evaluation by ID, Neurology.  Blood cultures positive (6/26) - await ID/sens.   CT surgery input noted. No planned surgery at this time.         IV Abx likely with PICC line after culture results are known or after bacteremia clears.  CTA without evidence of mycotic aneurysms, + evidence of prior CVA    Appreciate heme/onc input     Physical therapy   DVT ppX

## 2018-01-29 NOTE — PROGRESS NOTE ADULT - ASSESSMENT
Anemia suspected AOCD.  Anemia w/u to be ordered once she has Pic line placed.  Hgb low but stable and adequate at this time and would monitor.    CVA needs a hypercoagulable w/u also to be ordered once Pic line placed as she has difficult venous access.

## 2018-01-29 NOTE — PROGRESS NOTE ADULT - SUBJECTIVE AND OBJECTIVE BOX
infectious diseases progress note:    Patient is a 52y old  Female who presents with a chief complaint of       Other cerebrovascular disease        ROS:  CONSTITUTIONAL:  Negative fever or chills, feels well, good appetite  EYES:  Negative  blurry vision or double vision  CARDIOVASCULAR:  Negative for chest pain or palpitations  RESPIRATORY:  Negative for cough, wheezing, or SOB   GASTROINTESTINAL:  Negative for nausea, vomiting, diarrhea, constipation, or abdominal pain  GENITOURINARY:  Negative frequency, urgency or dysuria  NEUROLOGIC:  No headache, confusion, dizziness, lightheadedness    Allergies    No Known Allergies    Intolerances        ANTIBIOTICS/RELEVANT:  antimicrobials  cefTRIAXone   IVPB      cefTRIAXone   IVPB 2 Gram(s) IV Intermittent every 24 hours    immunologic:    OTHER:  acetaminophen   Tablet 650 milliGRAM(s) Oral every 6 hours PRN  sodium chloride 0.9%. 1000 milliLiter(s) IV Continuous <Continuous>      Objective:  Vital Signs Last 24 Hrs  T(C): 36.7 (29 Jan 2018 04:10), Max: 37 (28 Jan 2018 20:59)  T(F): 98.1 (29 Jan 2018 04:10), Max: 98.6 (28 Jan 2018 20:59)  HR: 81 (29 Jan 2018 04:10) (74 - 81)  BP: 119/74 (29 Jan 2018 04:10) (97/58 - 119/74)  BP(mean): --  RR: 18 (29 Jan 2018 04:10) (18 - 18)  SpO2: 100% (29 Jan 2018 04:10) (99% - 100%)    PHYSICAL EXAM:  Constitutional:Well-developed, well nourished--no acute distress  Eyes:RHIANNON, EOMI  Ear/Nose/Throat: no oral lesion, no sinus tenderness on percussion	  Neck:no JVD, no lymphadenopathy, supple  Respiratory: CTA aurea  Cardiovascular: S1S2 RRR, no murmurs  Gastrointestinal:soft, (+) BS, no HSM  Extremities:no e/e/c        LABS:                        9.3    8.41  )-----------( 258      ( 29 Jan 2018 07:26 )             29.9     01-28    140  |  103  |  8   ----------------------------<  86  4.3   |  22  |  0.91    Ca    9.5      28 Jan 2018 10:12              MICROBIOLOGY:    RECENT CULTURES:  01-26 @ 11:33 .Blood Blood       Growth in aerobic and anaerobic bottles:  Gram Positive Cocci in Pairs and Chains           Growth in aerobic and anaerobic bottles:  Streptococcus mitis/oralis  See previous culture [id number]    01-26 @ 00:49 .Blood   PCR    Growth in aerobic and anaerobic bottles: Gram Positive Cocci in Pairs and  Chains    Blood Culture PCR  Streptococcus mitis/oralis  Blood Culture PCR     Growth in aerobic and anaerobic bottles: Streptococcus mitis/oralis  ***Blood Panel PCR results on this specimen are available  approximately 3 hours after the Gram stain result.***  Gram stain, PCR, and/or culture results may not always  correspond due to difference in methodologies.  ************************************************************  This PCR assay was performed using [employer].  The following targets are tested for: Enterococcus,  vancomycin resistant enterococci, Listeriamonocytogenes,  coagulase negative staphylococci, S. aureus,  methicillin resistant S. aureus, Streptococcus agalactiae  (Group B), S. pneumoniae, S. pyogenes (Group A),  Acinetobacter baumannii, Enterobacter cloacae, E. coli,  Klebsiella oxytoca, K.pneumoniae, Proteus sp.,  Serratia marcescens, Haemophilus influenzae,  Neisseria meningitidis, Pseudomonas aeruginosa, Candida  albicans, C. glabrata, C krusei, C parapsilosis,  C. tropicalis and the KPC resistance gene.    01-26 @ 00:48 .Urine Clean Catch (Midstream)                10,000 - 49,000 CFU/mL Coag Negative Staphylococcus          RESPIRATORY CULTURES:              RADIOLOGY & ADDITIONAL STUDIES:        Pager [phone]  After 5 pm/weekends or if no response :[phone]

## 2018-01-29 NOTE — PROGRESS NOTE ADULT - ASSESSMENT
52 yr with hx of two cva yrs ago, ( unexplained) mitral insufficiency, recently admitted after outpt [place] demonstrated possible vegetation and she was found to have fever 102 at that time. Denies previously knowing that she was febrile .  No recent dental work but has lost 20 lbs of weight unintentionally.  no travel.   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     Plan:  Continue with Ceftriaxone 2 gm iv q24h  Stopped Vancomycin  will add q day genta for 2 weeks    picc line once we have negative follow up cultures  cta negative

## 2018-01-29 NOTE — PROGRESS NOTE ADULT - SUBJECTIVE AND OBJECTIVE BOX
Subjective: Patient seen and examined. No new events except as noted.   States to feel fine   no cp or sob     REVIEW OF SYSTEMS:    CONSTITUTIONAL: No weakness, fevers or chills  EYES/ENT: No visual changes;  No vertigo or throat pain   NECK: No pain or stiffness  RESPIRATORY: No cough, wheezing, hemoptysis; No shortness of breath  CARDIOVASCULAR: No chest pain or palpitations  GASTROINTESTINAL: No abdominal or epigastric pain. No nausea, vomiting, or hematemesis; No diarrhea or constipation. No melena or hematochezia.  GENITOURINARY: No dysuria, frequency or hematuria  NEUROLOGICAL: No numbness or weakness  SKIN: No itching, burning, rashes, or lesions   All other review of systems is negative unless indicated above.    MEDICATIONS:  MEDICATIONS  (STANDING):  cefTRIAXone   IVPB      cefTRIAXone   IVPB 2 Gram(s) IV Intermittent every 24 hours  gentamicin   IVPB 140 milliGRAM(s) IV Intermittent once  gentamicin   IVPB      sodium chloride 0.9%. 1000 milliLiter(s) (75 mL/Hr) IV Continuous <Continuous>      PHYSICAL EXAM:  T(C): 36.7 (01-29-18 @ 04:10), Max: 37 (01-28-18 @ 20:59)  HR: 81 (01-29-18 @ 04:10) (74 - 81)  BP: 119/74 (01-29-18 @ 04:10) (97/58 - 119/74)  RR: 18 (01-29-18 @ 04:10) (18 - 18)  SpO2: 100% (01-29-18 @ 04:10) (99% - 100%)  Wt(kg): --  I&O's Summary    28 Jan 2018 07:01  -  29 Jan 2018 07:00  --------------------------------------------------------  IN: 2105 mL / OUT: 0 mL / NET: 2105 mL          Appearance: Normal	  HEENT:   Normal oral mucosa, PERRL, EOMI	  Lymphatic: No lymphadenopathy , no edema  Cardiovascular: Normal S1 S2, No JVD, No murmurs , Peripheral pulses palpable 2+ bilaterally  Respiratory: Lungs clear to auscultation, normal effort 	  Gastrointestinal:  Soft, Non-tender, + BS	  Skin: No rashes, No ecchymoses, No cyanosis, warm to touch  Musculoskeletal: Normal range of motion, normal strength  Psychiatry:  Mood & affect appropriate  Ext: No edema  neuro; left hemiparesis     LABS:    CARDIAC MARKERS:                                9.3    8.41  )-----------( 258      ( 29 Jan 2018 07:26 )             29.9     01-28< from: CT Abdomen and Pelvis w/ IV Cont (01.27.18 @ 18:18) >  EXAM:  CT ABDOMEN AND PELVIS IC                            PROCEDURE DATE:  01/27/2018            INTERPRETATION:  CLINICAL INFORMATION: Rule out source of strep   bacteremia/mitral valve vegetation.    COMPARISON: None.    PROCEDURE:   CT of the Abdomen and Pelvis was performed with intravenous contrast.   Intravenous contrast: 90 ml Omnipaque 350. 10 ml discarded.  Oral contrast: None.  Sagittal and coronal reformats were performed.    FINDINGS:    LOWER CHEST: Within normal limits.    LIVER:Hepatomegaly.  BILE DUCTS: Normal caliber.  GALLBLADDER: Cholelithiasis.  SPLEEN: Within normal limits.  PANCREAS: Within normal limits.  ADRENALS: Within normal limits.  KIDNEYS/URETERS: Symmetric enhancement without hydronephrosis. A   subcentimeter hypodensity in the interpolar region of the right kidney is   too small to characterize.    BLADDER: Within normal limits.  REPRODUCTIVE ORGANS: Calcified myomatous uterus. No adnexal mass.    BOWEL: No bowel obstruction or inflammation. Appendix isnormal.  PERITONEUM: Trace pelvic ascites.  VESSELS:  Mild atherosclerotic changes.  RETROPERITONEUM: No lymphadenopathy.    ABDOMINAL WALL: Within normal limits.  BONES: Within normal limits.    IMPRESSION:   No acute abdominal or pelvic pathology.  Hepatomegaly.                  ISMAEL JACKSON M.D., RADIOLOGY RESIDENT  This document has been electronically signed.  CHRISTIANO GARCES M.D., ATTENDING RADIOLOGIST  This document has been electronically signed. Jan 28 2018 10:17AM                < end of copied text >      140  |  103  |  8   ----------------------------<  86  4.3   |  22  |  0.91    Ca    9.5      28 Jan 2018 10:12      proBNP:   Lipid Profile:   HgA1c:   TSH:             TELEMETRY: 	 SR  ECG:  	  RADIOLOGY:     DIAGNOSTIC TESTING:  [ ] Echocardiogram:  [ ]  Catheterization:  [ ] Stress Test:    OTHER:

## 2018-01-30 LAB
HCT VFR BLD CALC: 33.9 % — LOW (ref 34.5–45)
HGB BLD-MCNC: 10.2 G/DL — LOW (ref 11.5–15.5)
MCHC RBC-ENTMCNC: 27.3 PG — SIGNIFICANT CHANGE UP (ref 27–34)
MCHC RBC-ENTMCNC: 30.1 GM/DL — LOW (ref 32–36)
MCV RBC AUTO: 90.9 FL — SIGNIFICANT CHANGE UP (ref 80–100)
PLATELET # BLD AUTO: 305 K/UL — SIGNIFICANT CHANGE UP (ref 150–400)
RBC # BLD: 3.73 M/UL — LOW (ref 3.8–5.2)
RBC # FLD: 16.3 % — HIGH (ref 10.3–14.5)
WBC # BLD: 8.04 K/UL — SIGNIFICANT CHANGE UP (ref 3.8–10.5)
WBC # FLD AUTO: 8.04 K/UL — SIGNIFICANT CHANGE UP (ref 3.8–10.5)

## 2018-01-30 PROCEDURE — 99232 SBSQ HOSP IP/OBS MODERATE 35: CPT

## 2018-01-30 PROCEDURE — 71045 X-RAY EXAM CHEST 1 VIEW: CPT | Mod: 26

## 2018-01-30 RX ADMIN — CEFTRIAXONE 100 GRAM(S): 500 INJECTION, POWDER, FOR SOLUTION INTRAMUSCULAR; INTRAVENOUS at 09:03

## 2018-01-30 RX ADMIN — Medication 200 MILLIGRAM(S): at 10:03

## 2018-01-30 NOTE — PROGRESS NOTE ADULT - SUBJECTIVE AND OBJECTIVE BOX
infectious diseases progress note:    Patient is a 52y old  Female who presents with a chief complaint of       Other cerebrovascular disease        ROS:  CONSTITUTIONAL:  Negative fever or chills, feels well, good appetite  EYES:  Negative  blurry vision or double vision  CARDIOVASCULAR:  Negative for chest pain or palpitations  RESPIRATORY:  Negative for cough, wheezing, or SOB   GASTROINTESTINAL:  Negative for nausea, vomiting, diarrhea, constipation, or abdominal pain  GENITOURINARY:  Negative frequency, urgency or dysuria  NEUROLOGIC:  No headache, confusion, dizziness, lightheadedness    Allergies    No Known Allergies    Intolerances        ANTIBIOTICS/RELEVANT:  antimicrobials  cefTRIAXone   IVPB      cefTRIAXone   IVPB 2 Gram(s) IV Intermittent every 24 hours  gentamicin   IVPB 140 milliGRAM(s) IV Intermittent every 24 hours  gentamicin   IVPB        immunologic:    OTHER:  acetaminophen   Tablet 650 milliGRAM(s) Oral every 6 hours PRN  sodium chloride 0.9%. 1000 milliLiter(s) IV Continuous <Continuous>      Objective:  Vital Signs Last 24 Hrs  T(C): 36.6 (30 Jan 2018 04:22), Max: 36.7 (29 Jan 2018 14:44)  T(F): 97.9 (30 Jan 2018 04:22), Max: 98.1 (29 Jan 2018 14:44)  HR: 71 (30 Jan 2018 04:22) (71 - 76)  BP: 100/59 (30 Jan 2018 04:22) (100/59 - 111/64)  BP(mean): --  RR: 18 (30 Jan 2018 04:22) (18 - 18)  SpO2: 100% (30 Jan 2018 04:22) (96% - 100%)    PHYSICAL EXAM:  Constitutional:Well-developed, well nourished--no acute distress  Eyes:RHIANNON, EOMI  Ear/Nose/Throat: no oral lesion, no sinus tenderness on percussion	  Neck:no JVD, no lymphadenopathy, supple  Respiratory: CTA aurea  Cardiovascular: S1S2 RRR, no murmurs  Gastrointestinal:soft, (+) BS, no HSM  Extremities:no e/e/c        LABS:                        9.3    8.41  )-----------( 258      ( 29 Jan 2018 07:26 )             29.9     01-28    140  |  103  |  8   ----------------------------<  86  4.3   |  22  |  0.91    Ca    9.5      28 Jan 2018 10:12              MICROBIOLOGY:    RECENT CULTURES:  01-28 @ 10:07 .Blood Blood-Peripheral                No growth to date.    01-26 @ 11:33 .Blood Blood       Growth in aerobic and anaerobic bottles:  Gram Positive Cocci in Pairs and Chains           Growth in aerobic and anaerobic bottles:  Streptococcus mitis/oralis  See previous culture [id number]    01-26 @ 00:49 .Blood   PCR    Growth in aerobic and anaerobic bottles: Gram Positive Cocci in Pairs and  Chains    Blood Culture PCR  Streptococcus mitis/oralis  Blood Culture PCR     Growth in aerobic and anaerobic bottles: Streptococcus mitis/oralis  ***Blood Panel PCR results on this specimen are available  approximately 3 hours after the Gram stain result.***  Gram stain, PCR, and/or culture results may not always  correspond due to difference in methodologies.  ************************************************************  This PCR assay was performed using [employer].  The following targets are tested for: Enterococcus,  vancomycin resistant enterococci, Listeriamonocytogenes,  coagulase negative staphylococci, S. aureus,  methicillin resistant S. aureus, Streptococcus agalactiae  (Group B), S. pneumoniae, S. pyogenes (Group A),  Acinetobacter baumannii, Enterobacter cloacae, E. coli,  Klebsiella oxytoca, K.pneumoniae, Proteus sp.,  Serratia marcescens, Haemophilus influenzae,  Neisseria meningitidis, Pseudomonas aeruginosa, Candida  albicans, C. glabrata, C krusei, C parapsilosis,  C. tropicalis and the KPC resistance gene.    01-26 @ 00:48 .Urine Clean Catch (Midstream)                10,000 - 49,000 CFU/mL Coag Negative Staphylococcus          RESPIRATORY CULTURES:              RADIOLOGY & ADDITIONAL STUDIES:        Pager [phone]  After 5 pm/weekends or if no response :[phone]

## 2018-01-30 NOTE — PROGRESS NOTE ADULT - SUBJECTIVE AND OBJECTIVE BOX
Subjective: Patient seen and examined. No new events except as noted.   feeling well   no cp or sob     REVIEW OF SYSTEMS:    CONSTITUTIONAL: No weakness, fevers or chills  EYES/ENT: No visual changes;  No vertigo or throat pain   NECK: No pain or stiffness  RESPIRATORY: No cough, wheezing, hemoptysis; No shortness of breath  CARDIOVASCULAR: No chest pain or palpitations  GASTROINTESTINAL: No abdominal or epigastric pain. No nausea, vomiting, or hematemesis; No diarrhea or constipation. No melena or hematochezia.  GENITOURINARY: No dysuria, frequency or hematuria  NEUROLOGICAL: No numbness or weakness  SKIN: No itching, burning, rashes, or lesions   All other review of systems is negative unless indicated above.    MEDICATIONS:  MEDICATIONS  (STANDING):  cefTRIAXone   IVPB      cefTRIAXone   IVPB 2 Gram(s) IV Intermittent every 24 hours  gentamicin   IVPB 140 milliGRAM(s) IV Intermittent every 24 hours  gentamicin   IVPB      sodium chloride 0.9%. 1000 milliLiter(s) (75 mL/Hr) IV Continuous <Continuous>      PHYSICAL EXAM:  T(C): 36.6 (01-30-18 @ 04:22), Max: 36.7 (01-29-18 @ 14:44)  HR: 71 (01-30-18 @ 04:22) (71 - 76)  BP: 100/59 (01-30-18 @ 04:22) (100/59 - 111/64)  RR: 18 (01-30-18 @ 04:22) (18 - 18)  SpO2: 100% (01-30-18 @ 04:22) (96% - 100%)  Wt(kg): --  I&O's Summary    29 Jan 2018 07:01  -  30 Jan 2018 07:00  --------------------------------------------------------  IN: 2415 mL / OUT: 0 mL / NET: 2415 mL          Appearance: Normal	  HEENT:   Normal oral mucosa, PERRL, EOMI	  Lymphatic: No lymphadenopathy , no edema  Cardiovascular: Normal S1 S2, No JVD, + murmurs ,   Peripheral pulses palpable 2+ bilaterally  Respiratory: Lungs clear to auscultation, normal effort 	  Gastrointestinal:  Soft, Non-tender, + BS	  Skin: No rashes, No ecchymoses, No cyanosis, warm to touch  Musculoskeletal: Normal range of motion, normal strength  Psychiatry:  Mood & affect appropriate  Ext: No edema  Neuro: left hemiparesis     LABS:    CARDIAC MARKERS:                                10.2   8.04  )-----------( 305      ( 30 Jan 2018 07:39 )             33.9           proBNP:   Lipid Profile:   HgA1c:   TSH:     Blood Cxs reviewed       TELEMETRY: SR	    ECG:  	  RADIOLOGY:   DIAGNOSTIC TESTING:  [ ] Echocardiogram:  [ ]  Catheterization:  [ ] Stress Test:    OTHER:

## 2018-01-30 NOTE — PROGRESS NOTE ADULT - ASSESSMENT
52 yr with hx of two cva yrs ago, ( unexplained) mitral insufficiency, recently admitted after outpt [place] demonstrated possible vegetation and she was found to have fever 102 at that time. Denies previously knowing that she was febrile .  No recent dental work but has lost 20 lbs of weight unintentionally.  no travel.   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     Plan:  Continue with Ceftriaxone 2 gm iv q24h  Stopped Vancomycin  will add q day genta for 2 weeks       can have picc placed  and home care plans for discharge on IV antibiotics  I will follow her as her out pt discussed plans with pt in detail

## 2018-01-30 NOTE — PHYSICAL THERAPY INITIAL EVALUATION ADULT - PERTINENT HX OF CURRENT PROBLEM, REHAB EVAL
52 year old female with  PMHx of CVA x 2 ( 1993 & 2003)  presenting for outpt MVR evaluation via [place] as recommended by neurology and cardiology found to have fever as suspected endocarditis admitted for further evaluation . 52 year old female with  PMHx of CVA x 2 ( 1993 & 2003)  presenting for outpt MVR evaluation via [place] as recommended by neurology and cardiology found to have fever as suspected endocarditis admitted for further evaluation. CT angio head (1/26): chronic R corona radiata infarct. MR head (1/27): new punctate infarct L cerebellum, SAH in R sylvian fissure, old infarcts R basal ganglia region. Pt s/p TTE 1/25.

## 2018-01-30 NOTE — PHYSICAL THERAPY INITIAL EVALUATION ADULT - ACTIVE RANGE OF MOTION EXAMINATION, REHAB EVAL
deficits as listed below/Right UE Active ROM was WFL (within functional limits)/Right LE Active ROM was WFL (within functional limits)/Decr. L ankle DF; L UE maintained in flexor synergy pattern (L elbow and L fingers maintained in flexion). L shoulder flex ~60 deg.

## 2018-01-30 NOTE — PHYSICAL THERAPY INITIAL EVALUATION ADULT - MANUAL MUSCLE TESTING RESULTS, REHAB EVAL
grossly assessed due to/R UE/LE 4/5 except R hip flex 3+/5; L LE 3+/5 except L ankle DF 2+/5; L UE ~2+/5 throughout

## 2018-01-30 NOTE — PROGRESS NOTE ADULT - ASSESSMENT
51 y/o female with prior h/o CVA (x2 - both remote) presented with fever for [place], found to have severe MR with evidence of vegetation and portions of valve flailing.  No limiting symptoms.  In retrospect, pt may have been experiencing low-grade fevers associated with chills at home past few weeks.    Evaluation by ID, Neurology.  Most recent Blood cultures now  negative . IV abx as ordered by ID. Awaiting PICC line placement today.   CT surgery input noted. No planned surgery at this time.         IV Abx likely with PICC line after culture results are known or after bacteremia clears.  CTA without evidence of mycotic aneurysms, + evidence of prior CVA    Appreciate heme/onc input     Physical therapy   DVT ppX     Dc Planning when home care set up

## 2018-01-30 NOTE — PHYSICAL THERAPY INITIAL EVALUATION ADULT - PASSIVE RANGE OF MOTION EXAMINATION, REHAB EVAL
except L shoulder flex ~90 deg./deficits as listed below/bilateral lower extremity Passive ROM was WFL (within functional limits)/bilateral upper extremity Passive ROM was WFL (within functional limits)

## 2018-01-30 NOTE — PHYSICAL THERAPY INITIAL EVALUATION ADULT - ADDITIONAL COMMENTS
Pt lives with spouse, son, and 2 daughters in 2nd floor apartment with ramp or 6 stairs to enter building (L HR), full flight to 2nd floor (L HR). Pt owns wide-based cane. Pt reports h/o CVA in 1993 and 2003.

## 2018-01-30 NOTE — PHYSICAL THERAPY INITIAL EVALUATION ADULT - GENERAL OBSERVATIONS, REHAB EVAL
Pt received semi-supine in bed, (+) IV, NAD. Pt's spouse and son at bedside. Pt alert, agreeable to PT eval.

## 2018-01-31 VITALS
HEART RATE: 79 BPM | RESPIRATION RATE: 18 BRPM | DIASTOLIC BLOOD PRESSURE: 61 MMHG | TEMPERATURE: 98 F | SYSTOLIC BLOOD PRESSURE: 113 MMHG | OXYGEN SATURATION: 98 % | WEIGHT: 129.19 LBS

## 2018-01-31 LAB
AT III ACT/NOR PPP CHRO: 98 % — SIGNIFICANT CHANGE UP (ref 85–135)
DRVVT SCREEN TO CONFIRM RATIO: SIGNIFICANT CHANGE UP
FERRITIN SERPL-MCNC: 324 NG/ML — HIGH (ref 15–150)
FOLATE SERPL-MCNC: 13.2 NG/ML — SIGNIFICANT CHANGE UP (ref 4.8–24.2)
HAPTOGLOB SERPL-MCNC: 67 MG/DL — SIGNIFICANT CHANGE UP (ref 34–200)
HCT VFR BLD CALC: 31.3 % — LOW (ref 34.5–45)
HCYS SERPL-MCNC: 6.8 UMOL/L — SIGNIFICANT CHANGE UP (ref 5–15)
HGB BLD-MCNC: 9.9 G/DL — LOW (ref 11.5–15.5)
IRON SATN MFR SERPL: 17 % — SIGNIFICANT CHANGE UP (ref 14–50)
IRON SATN MFR SERPL: 34 UG/DL — SIGNIFICANT CHANGE UP (ref 30–160)
LA NT DPL PPP QL: 34.4 SEC — SIGNIFICANT CHANGE UP
LDH SERPL L TO P-CCNC: 387 U/L — HIGH (ref 50–242)
MCHC RBC-ENTMCNC: 28.3 PG — SIGNIFICANT CHANGE UP (ref 27–34)
MCHC RBC-ENTMCNC: 31.6 GM/DL — LOW (ref 32–36)
MCV RBC AUTO: 89.4 FL — SIGNIFICANT CHANGE UP (ref 80–100)
NORMALIZED SCT PPP-RTO: 0.9 RATIO — SIGNIFICANT CHANGE UP (ref 0–1.16)
NORMALIZED SCT PPP-RTO: SIGNIFICANT CHANGE UP
PLATELET # BLD AUTO: 305 K/UL — SIGNIFICANT CHANGE UP (ref 150–400)
PROT C ACT/NOR PPP: 102 % — SIGNIFICANT CHANGE UP (ref 74–150)
RBC # BLD: 3.5 M/UL — LOW (ref 3.8–5.2)
RBC # FLD: 16.9 % — HIGH (ref 10.3–14.5)
TIBC SERPL-MCNC: 198 UG/DL — LOW (ref 220–430)
UIBC SERPL-MCNC: 164 UG/DL — SIGNIFICANT CHANGE UP (ref 110–370)
VIT B12 SERPL-MCNC: 1313 PG/ML — HIGH (ref 232–1245)
WBC # BLD: 9.45 K/UL — SIGNIFICANT CHANGE UP (ref 3.8–10.5)
WBC # FLD AUTO: 9.45 K/UL — SIGNIFICANT CHANGE UP (ref 3.8–10.5)

## 2018-01-31 PROCEDURE — 83615 LACTATE (LD) (LDH) ENZYME: CPT

## 2018-01-31 PROCEDURE — 93312 ECHO TRANSESOPHAGEAL: CPT

## 2018-01-31 PROCEDURE — G0452: CPT | Mod: 26

## 2018-01-31 PROCEDURE — 70496 CT ANGIOGRAPHY HEAD: CPT

## 2018-01-31 PROCEDURE — 83036 HEMOGLOBIN GLYCOSYLATED A1C: CPT

## 2018-01-31 PROCEDURE — 85300 ANTITHROMBIN III ACTIVITY: CPT

## 2018-01-31 PROCEDURE — 81001 URINALYSIS AUTO W/SCOPE: CPT

## 2018-01-31 PROCEDURE — 74177 CT ABD & PELVIS W/CONTRAST: CPT

## 2018-01-31 PROCEDURE — 85045 AUTOMATED RETICULOCYTE COUNT: CPT

## 2018-01-31 PROCEDURE — 83721 ASSAY OF BLOOD LIPOPROTEIN: CPT

## 2018-01-31 PROCEDURE — 84155 ASSAY OF PROTEIN SERUM: CPT

## 2018-01-31 PROCEDURE — 82607 VITAMIN B-12: CPT

## 2018-01-31 PROCEDURE — 82746 ASSAY OF FOLIC ACID SERUM: CPT

## 2018-01-31 PROCEDURE — 70551 MRI BRAIN STEM W/O DYE: CPT

## 2018-01-31 PROCEDURE — 87040 BLOOD CULTURE FOR BACTERIA: CPT

## 2018-01-31 PROCEDURE — 87150 DNA/RNA AMPLIFIED PROBE: CPT

## 2018-01-31 PROCEDURE — 84165 PROTEIN E-PHORESIS SERUM: CPT

## 2018-01-31 PROCEDURE — 83090 ASSAY OF HOMOCYSTEINE: CPT

## 2018-01-31 PROCEDURE — 81240 F2 GENE: CPT

## 2018-01-31 PROCEDURE — 76376 3D RENDER W/INTRP POSTPROCES: CPT

## 2018-01-31 PROCEDURE — 83550 IRON BINDING TEST: CPT

## 2018-01-31 PROCEDURE — 71045 X-RAY EXAM CHEST 1 VIEW: CPT

## 2018-01-31 PROCEDURE — C1894: CPT

## 2018-01-31 PROCEDURE — 85027 COMPLETE CBC AUTOMATED: CPT

## 2018-01-31 PROCEDURE — 82728 ASSAY OF FERRITIN: CPT

## 2018-01-31 PROCEDURE — 81241 F5 GENE: CPT

## 2018-01-31 PROCEDURE — 99233 SBSQ HOSP IP/OBS HIGH 50: CPT

## 2018-01-31 PROCEDURE — 83010 ASSAY OF HAPTOGLOBIN QUANT: CPT

## 2018-01-31 PROCEDURE — 87086 URINE CULTURE/COLONY COUNT: CPT

## 2018-01-31 PROCEDURE — 97161 PT EVAL LOW COMPLEX 20 MIN: CPT

## 2018-01-31 PROCEDURE — 93306 TTE W/DOPPLER COMPLETE: CPT

## 2018-01-31 PROCEDURE — 86334 IMMUNOFIX E-PHORESIS SERUM: CPT

## 2018-01-31 PROCEDURE — 87184 SC STD DISK METHOD PER PLATE: CPT

## 2018-01-31 PROCEDURE — 80048 BASIC METABOLIC PNL TOTAL CA: CPT

## 2018-01-31 PROCEDURE — 36569 INSJ PICC 5 YR+ W/O IMAGING: CPT

## 2018-01-31 PROCEDURE — 85303 CLOT INHIBIT PROT C ACTIVITY: CPT

## 2018-01-31 RX ORDER — CEFTRIAXONE 500 MG/1
2 INJECTION, POWDER, FOR SOLUTION INTRAMUSCULAR; INTRAVENOUS
Qty: 23 | Refills: 0 | OUTPATIENT
Start: 2018-01-31 | End: 2018-02-22

## 2018-01-31 RX ORDER — ACETAMINOPHEN 500 MG
650 TABLET ORAL ONCE
Qty: 0 | Refills: 0 | Status: COMPLETED | OUTPATIENT
Start: 2018-01-31 | End: 2018-01-31

## 2018-01-31 RX ORDER — GENTAMICIN SULFATE 40 MG/ML
140 VIAL (ML) INJECTION
Qty: 10 | Refills: 0 | OUTPATIENT
Start: 2018-01-31 | End: 2018-02-09

## 2018-01-31 RX ADMIN — CEFTRIAXONE 100 GRAM(S): 500 INJECTION, POWDER, FOR SOLUTION INTRAMUSCULAR; INTRAVENOUS at 09:11

## 2018-01-31 RX ADMIN — Medication 650 MILLIGRAM(S): at 02:30

## 2018-01-31 RX ADMIN — Medication 200 MILLIGRAM(S): at 10:13

## 2018-01-31 RX ADMIN — Medication 650 MILLIGRAM(S): at 03:30

## 2018-01-31 NOTE — CHART NOTE - NSCHARTNOTEFT_GEN_A_CORE
Request from Dr. Cash to facilitate patient discharge. Medication reconciliation reviewed, revised, and resolved with Dr. Cash who had medically cleared patient for discharge with follow-up as advised. Please refer to discharge note for detailed hospital course. Patient is currently stable for discharge to home with home care at this time.    Contact # [phone]

## 2018-01-31 NOTE — PROGRESS NOTE ADULT - ATTENDING COMMENTS
I spoke with the patient and her  in detail about the above-mentioned plan and onset all of their questions.

## 2018-01-31 NOTE — PROGRESS NOTE ADULT - SUBJECTIVE AND OBJECTIVE BOX
Subjective: Patient seen and examined. No new events except as noted.   feels well   s/p PICC line yesterday       REVIEW OF SYSTEMS:    CONSTITUTIONAL: No weakness, fevers or chills  EYES/ENT: No visual changes;  No vertigo or throat pain   NECK: No pain or stiffness  RESPIRATORY: No cough, wheezing, hemoptysis; No shortness of breath  CARDIOVASCULAR: No chest pain or palpitations  GASTROINTESTINAL: No abdominal or epigastric pain. No nausea, vomiting, or hematemesis; No diarrhea or constipation. No melena or hematochezia.  GENITOURINARY: No dysuria, frequency or hematuria  NEUROLOGICAL: No numbness or weakness  SKIN: No itching, burning, rashes, or lesions   All other review of systems is negative unless indicated above.    MEDICATIONS:  MEDICATIONS  (STANDING):  cefTRIAXone   IVPB      cefTRIAXone   IVPB 2 Gram(s) IV Intermittent every 24 hours  gentamicin   IVPB 140 milliGRAM(s) IV Intermittent every 24 hours  gentamicin   IVPB      sodium chloride 0.9%. 1000 milliLiter(s) (75 mL/Hr) IV Continuous <Continuous>      PHYSICAL EXAM:  T(C): 36.6 (01-31-18 @ 07:00), Max: 36.9 (01-30-18 @ 20:33)  HR: 66 (01-31-18 @ 07:00) (66 - 85)  BP: 145/91 (01-31-18 @ 07:00) (103/54 - 145/91)  RR: 16 (01-31-18 @ 07:00) (16 - 18)  SpO2: 99% (01-31-18 @ 07:00) (99% - 100%)  Wt(kg): --  I&O's Summary    30 Jan 2018 07:01  -  31 Jan 2018 07:00  --------------------------------------------------------  IN: 720 mL / OUT: 0 mL / NET: 720 mL          Appearance: Normal	  HEENT:   Normal oral mucosa, PERRL, EOMI	  Lymphatic: No lymphadenopathy , no edema  Cardiovascular: Normal S1 S2, No JVD, No murmurs , Peripheral pulses palpable 2+ bilaterally  Respiratory: Lungs clear to auscultation, normal effort 	  Gastrointestinal:  Soft, Non-tender, + BS	  Skin: No rashes, No ecchymoses, No cyanosis, warm to touch  Musculoskeletal: Normal range of motion, normal strength  Psychiatry:  Mood & affect appropriate  Ext: No edema, RUE PICC line   neuro: left sided hemiparesis     LABS:    CARDIAC MARKERS:                                9.9    9.45  )-----------( 305      ( 31 Jan 2018 07:24 )             31.3           proBNP:   Lipid Profile:   HgA1c:   TSH:             TELEMETRY: SR	    ECG:  	  RADIOLOGY:   DIAGNOSTIC TESTING:  [ ] Echocardiogram:  [ ]  Catheterization:  [ ] Stress Test:    OTHER:

## 2018-01-31 NOTE — PROGRESS NOTE ADULT - SUBJECTIVE AND OBJECTIVE BOX
THE PATIENT WAS SEEN AND EXAMINED BY ME WITH THE HOUSESTAFF AND STROKE TEAM DURING MORNING ROUNDS.   HPI:  52 year old female with  PMHx of CVA x 2 ( 1993 & 2003)  presenting for output MVR evaluation via [facility] as recommended by neurology and cardiology. Patient was found to have fever,  suspected endocarditis and was subsequently  admitted for further evaluation . During course patient had MRI showing small L cerebellar infarct, and CTA imaging with bulbous prominence. Neurology consulted for further management. Patient denies any headache, blurred vision, or focal defcits at this time.       SUBJECTIVE: No events overnight.  No new neurologic complaints.      acetaminophen   Tablet 650 milliGRAM(s) Oral every 6 hours PRN  cefTRIAXone   IVPB      cefTRIAXone   IVPB 2 Gram(s) IV Intermittent every 24 hours  gentamicin   IVPB 140 milliGRAM(s) IV Intermittent every 24 hours  gentamicin   IVPB      sodium chloride 0.9%. 1000 milliLiter(s) IV Continuous <Continuous>      PHYSICAL EXAM:   Vital Signs Last 24 Hrs  T(C): 36.8 (31 Jan 2018 11:41), Max: 36.9 (30 Jan 2018 20:33)  T(F): 98.2 (31 Jan 2018 11:41), Max: 98.5 (30 Jan 2018 20:33)  HR: 79 (31 Jan 2018 11:41) (66 - 85)  BP: 113/61 (31 Jan 2018 11:41) (112/55 - 145/91)  BP(mean): --  RR: 18 (31 Jan 2018 11:41) (16 - 18)  SpO2: 98% (31 Jan 2018 11:41) (98% - 100%)      General: Resting in bed. No acute distress  HEENT: Normocephalic, atraumatic. EOM intact, visual fields full  Abdomen: Soft, nontender, nondistended   Extremities: No edema. Right PICC insitu.    NEUROLOGICAL EXAM:  Mental status: Awake, alert, oriented x3, no aphasia, no neglect, normal memory, able to name/repeat/comprehend.  Cranial Nerves: No facial asymmetry, no nystagmus, no dysarthria, no dysphagia. Tongue is  midline, shoulder shrug intact bilaterally.  Motor exam: Normal tone, no drift, 5/5 RUE, 5/5 RLE, 5/5 LUE, 5/5 LLE.S  Sensation: Intact to light touch   Coordination/ Gait: No dysmetria. Gait not assessed, on bedrest     LABS:                        9.9    9.45  )-----------( 305      ( 31 Jan 2018 07:24 )             31.3          Hemoglobin A1C, Whole Blood: 5.6 % (01-27 @ 09:02)  LDL Cholesterol, Direct: 87 mg/dL (01-27 @ 09:09)      IMAGING: Reviewed by me.      MR Head No Cont (01.27.18 @ 00:33)     INDICATIONS:  h/o CVA, SAH    COMPARISON EXAMINATION: 1/5/2018, CT dated 1/26/2018 with CTA    IMPRESSION: Compared with prior MR new punctate infarct in the left   cerebellum inferior to previously identified focus on the prior MR. More   conspicuous areas of subarachnoid hemorrhage in the right sylvian fissure   region as well as in the right precentral sulcus region more conspicuous   compared with the prior MR study though not well identified on the   patient's prior CT 1/26/2018. There are old infarcts in the right basal   ganglia region as well as microvascular ischemic changes noted. However  patchy foci of signal hyperintensity in the right periventricular region   as seen previously are somewhat different in appearance of raise the   possibility of demyelinating pathology is well.        [facility] w/TTE (w/3D Echo) (01.25.18 @ 14:15)   ------------------------------------------------------------------------  Conclusions:  1. Mitral valve prolapse involving the anterior mitral  leaflet with flail A2/3 segment. There is a small mobile  independently mobile short linear-like echodensity at the  P2 /P3 annulus whiich measures 0.4cm x0.3cm. This may  represent a vegetation. The tips of the anterior mitral  valve leaflet is thickened  and measures up to 6mm. Severe  mitral regurgitation eccentric posterior mitral  regurgitation.  Flow reversal is seen in the pulmonary  veins. ERO by PISA is 0.67sqcm  2. Trileaflet aortic valve with normal opening. The  leaflets of the aortic valve prolapse.  3. Mild left ventricular enlargement.  4. Normal leftventricular systolic function.  5. Normal right ventricular size and function.  *** No previous Echo exam.  Findings discussed with Dr. Odilon Cash at time of [facility].

## 2018-01-31 NOTE — PROGRESS NOTE ADULT - ASSESSMENT
52 yr with hx of two cva yrs ago, ( unexplained) mitral insufficiency, recently admitted after outpt [facility] demonstrated possible vegetation and she was found to have fever 102 at that time. Denies previously knowing that she was febrile .  No recent dental work but has lost 20 lbs of weight unintentionally.  no travel.   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     Plan:  Continue with Ceftriaxone 2 gm iv q24h  Stopped Vancomycin  will add q day genta for 2 weeks       can have picc placed  and home care plans for discharge on IV antibiotics  I will follow her as her out pt discussed plans with pt in detail     plan ceftriaxone until feb 23    gentamicin until feb 10  creat cbc q week  follow up in office  discussed with pt risks of gentamicin and to call  for any symptoms of toxicity

## 2018-01-31 NOTE — PROGRESS NOTE ADULT - ASSESSMENT
Anemia suspected AOCD.  Anemia w/u ordered . Hgb low but stable and adequate at this time and would monitor.    CVA needs a hypercoagulable which i  ordered .

## 2018-01-31 NOTE — PROGRESS NOTE ADULT - ASSESSMENT
51 y/o female with prior h/o CVA (x2 - both remote) presented with fever for [facility], found to have severe MR with evidence of vegetation and portions of valve flailing.  No limiting symptoms.  In retrospect, pt may have been experiencing low-grade fevers associated with chills at home past few weeks.    Evaluation by ID, Neurology.  Most recent Blood cultures now  negative . IV abx as ordered by ID. s/p  PICC line placement yesterday   CT surgery input noted. No planned surgery at this time.         CTA without evidence of mycotic aneurysms, + evidence of prior CVA    Appreciate heme/onc input     Physical therapy   DVT ppX     Dc Planning when home care set up for IV Abx as ordered by ID   Follow up with me in office next week

## 2018-01-31 NOTE — PROGRESS NOTE ADULT - SUBJECTIVE AND OBJECTIVE BOX
infectious diseases progress note:    Patient is a 52y old  Female who presents with a chief complaint of       Other cerebrovascular disease        ROS:  CONSTITUTIONAL:  Negative fever or chills, feels well, good appetite  EYES:  Negative  blurry vision or double vision  CARDIOVASCULAR:  Negative for chest pain or palpitations  RESPIRATORY:  Negative for cough, wheezing, or SOB   GASTROINTESTINAL:  Negative for nausea, vomiting, diarrhea, constipation, or abdominal pain  GENITOURINARY:  Negative frequency, urgency or dysuria  NEUROLOGIC:  No headache, confusion, dizziness, lightheadedness    Allergies    No Known Allergies    Intolerances        ANTIBIOTICS/RELEVANT:  antimicrobials  cefTRIAXone   IVPB      cefTRIAXone   IVPB 2 Gram(s) IV Intermittent every 24 hours  gentamicin   IVPB 140 milliGRAM(s) IV Intermittent every 24 hours  gentamicin   IVPB        immunologic:    OTHER:  acetaminophen   Tablet 650 milliGRAM(s) Oral every 6 hours PRN  sodium chloride 0.9%. 1000 milliLiter(s) IV Continuous <Continuous>      Objective:  Vital Signs Last 24 Hrs  T(C): 36.7 (31 Jan 2018 04:22), Max: 36.9 (30 Jan 2018 20:33)  T(F): 98.1 (31 Jan 2018 04:22), Max: 98.5 (30 Jan 2018 20:33)  HR: 69 (31 Jan 2018 04:22) (68 - 85)  BP: 112/55 (31 Jan 2018 04:22) (103/54 - 144/74)  BP(mean): --  RR: 18 (31 Jan 2018 04:22) (18 - 18)  SpO2: 99% (31 Jan 2018 04:22) (99% - 100%)    PHYSICAL EXAM:  Constitutional:Well-developed, well nourished--no acute distress  Eyes:RHIANNON, EOMI  Ear/Nose/Throat: no oral lesion, no sinus tenderness on percussion	  Neck:no JVD, no lymphadenopathy, supple  Respiratory: CTA aurea  Cardiovascular: S1S2 RRR, no murmurs  Gastrointestinal:soft, (+) BS, no HSM  Extremities:no e/e/c        LABS:                        9.9    9.45  )-----------( 305      ( 31 Jan 2018 07:24 )             31.3                   MICROBIOLOGY:    RECENT CULTURES:  01-28 @ 10:07 .Blood Blood-Peripheral                No growth to date.    01-26 @ 11:33 .Blood Blood       Growth in aerobic and anaerobic bottles:  Gram Positive Cocci in Pairs and Chains           Growth in aerobic and anaerobic bottles:  Streptococcus mitis/oralis  See previous culture [id number]    01-26 @ 00:49 .Blood   PCR    Growth in aerobic and anaerobic bottles: Gram Positive Cocci in Pairs and  Chains    Blood Culture PCR  Streptococcus mitis/oralis  Blood Culture PCR     Growth in aerobic and anaerobic bottles: Streptococcus mitis/oralis  ***Blood Panel PCR results on this specimen are available  approximately 3 hours after the Gram stain result.***  Gram stain, PCR, and/or culture results may not always  correspond due to difference in methodologies.  ************************************************************  This PCR assay was performed using [employer].  The following targets are tested for: Enterococcus,  vancomycin resistant enterococci, Listeriamonocytogenes,  coagulase negative staphylococci, S. aureus,  methicillin resistant S. aureus, Streptococcus agalactiae  (Group B), S. pneumoniae, S. pyogenes (Group A),  Acinetobacter baumannii, Enterobacter cloacae, E. coli,  Klebsiella oxytoca, K.pneumoniae, Proteus sp.,  Serratia marcescens, Haemophilus influenzae,  Neisseria meningitidis, Pseudomonas aeruginosa, Candida  albicans, C. glabrata, C krusei, C parapsilosis,  C. tropicalis and the KPC resistance gene.    01-26 @ 00:48 .Urine Clean Catch (Midstream)                10,000 - 49,000 CFU/mL Coag Negative Staphylococcus          RESPIRATORY CULTURES:              RADIOLOGY & ADDITIONAL STUDIES:        Pager [phone]  After 5 pm/weekends or if no response :[phone]

## 2018-01-31 NOTE — PROGRESS NOTE ADULT - SUBJECTIVE AND OBJECTIVE BOX
SHEREEN KELLOGG  MRN-[id number]    Patient is a 52y old  Female who presents with a chief complaint of     Review of System  REVIEW OF SYSTEMS      General:	Denies fatigue, fevers, chills, sweats, decreased appetite.    Skin/Breast: denies pruritis, rash  	  Ophthalmologic: no change in vision or blurring  	  HEENT	Denies dry mouth, oral sores, dysphagia,  change in hearing.    Respiratory and Thorax:  cough, sob, wheeze, hemoptysis  	  Cardiovascular:	no cp , palp, orthopnea    Gastrointestinal:	no n/v/d constipation    Genitourinary:	no dysuria of frequency, no hematuria, no flank pain    Musculoskeletal:	no bone or joint pain. no muscle aches.     Neurological:	no change in sensory or motor function. no headache. no weakness.     Psychiatric:	no depression, no anxiety, insomnia.     Hematology/Lymphatics:	no bleeding or bruising        Current Meds  MEDICATIONS  (STANDING):  cefTRIAXone   IVPB      cefTRIAXone   IVPB 2 Gram(s) IV Intermittent every 24 hours  gentamicin   IVPB 140 milliGRAM(s) IV Intermittent every 24 hours  gentamicin   IVPB      sodium chloride 0.9%. 1000 milliLiter(s) (75 mL/Hr) IV Continuous <Continuous>    MEDICATIONS  (PRN):  acetaminophen   Tablet 650 milliGRAM(s) Oral every 6 hours PRN For Temp greater than 38 C (100.4 F)      Vitals  Vital Signs Last 24 Hrs  T(C): 36.7 (31 Jan 2018 04:22), Max: 36.9 (30 Jan 2018 20:33)  T(F): 98.1 (31 Jan 2018 04:22), Max: 98.5 (30 Jan 2018 20:33)  HR: 69 (31 Jan 2018 04:22) (68 - 85)  BP: 112/55 (31 Jan 2018 04:22) (103/54 - 144/74)  BP(mean): --  RR: 18 (31 Jan 2018 04:22) (18 - 18)  SpO2: 99% (31 Jan 2018 04:22) (99% - 100%)    Physical Exam    Constitutional: NAD    Lab  CBC Full  -  ( 31 Jan 2018 07:24 )  WBC Count : 9.45 K/uL  Hemoglobin : 9.9 g/dL  Hematocrit : 31.3 %  Platelet Count - Automated : 305 K/uL  Mean Cell Volume : 89.4 fl  Mean Cell Hemoglobin : 28.3 pg  Mean Cell Hemoglobin Concentration : 31.6 gm/dL  Auto Neutrophil # : x  Auto Lymphocyte # : x  Auto Monocyte # : x  Auto Eosinophil # : x  Auto Basophil # : x  Auto Neutrophil % : x  Auto Lymphocyte % : x  Auto Monocyte % : x  Auto Eosinophil % : x  Auto Basophil % : x    Rad:    Assessment/Plan

## 2018-01-31 NOTE — PROGRESS NOTE ADULT - ASSESSMENT
ASSESSMENT:  This is  a 52 year old female with  PMHx of CVA x 2 ( 1993 & 2003)  presenting for output MVR evaluation via [facility] as recommended by neurology and cardiology. Patient was found to have fever,  suspected endocarditis and was subsequently  admitted for further evaluation . During course patient had MRI showing small L cerebellar infarct, and CTA imaging with bulbous prominence and MRI/A concerning for SAH.  [facility] findings :Mitral valve prolapse  with a   independently mobile short linear-like echodensity; suspected  vegetation. Patient known to Dr. Korey Sterling of neurosurgery. Patient continues on antibiotic prophylaxis per ID recommendation. Neurologically patient's exam is non focal; she remains at her neurologic baseline. Surgical intervention for possible valve replacement has been deferred at this time in favor of continued antibiotic prophylaxis as outpatient.      Plan is for patient to undergo follow up [facility] when antibiotic course is completed for determination of  need for surgical intervention at that time. Should valve replacement be determined to be indicated, will defer to discretion of Dr. Sterling as to  indication for cerebral angiogram at that time. Timing and need to be determined by Cardiology, CT surgery and ID. Given the overall clinical picture and concern for mycotic aneurysm and recent bleeding would continue to hold  ASA and Statin for now, this will be reevaluated on patient's subsequent neurological follow-up with Dr. Fine, the appointment has already  been planned for early next week. Patient is neurologically stable for discharge with planned followup as discussed with patient and her , all questions answered. Plan of care discussed with  Dr. Cash who is in concurrence, plan also discussed medicine NP, Armando.       DISPOSITION: Home depending on primary team.         CORE MEASURES:        Admission NIHSS: No     TPA: [] YES [x] NO      LDL/HDL: LDL is 87     Depression Screen: 0     Statin Therapy: Not in setting of bleed, will be reevaluated on subsequent Neuro appointment early next week.     Dysphagia Screen: [x] PASS [] FAIL     Smoking [] YES [x] NO      Afib [] YES [x] NO     Stroke Education [x] YES [] NO

## 2018-02-01 LAB
PROT SERPL-MCNC: 6.9 G/DL — SIGNIFICANT CHANGE UP (ref 6–8.3)
PROT SERPL-MCNC: 6.9 G/DL — SIGNIFICANT CHANGE UP (ref 6–8.3)

## 2018-02-02 LAB
% ALBUMIN: 46.3 % — SIGNIFICANT CHANGE UP
% ALPHA 1: 7.9 % — SIGNIFICANT CHANGE UP
% ALPHA 2: 11.6 % — SIGNIFICANT CHANGE UP
% BETA: 12.9 % — SIGNIFICANT CHANGE UP
% GAMMA: 21.3 % — SIGNIFICANT CHANGE UP
ALBUMIN SERPL ELPH-MCNC: 3.2 G/DL — LOW (ref 3.6–5.5)
ALBUMIN/GLOB SERPL ELPH: 0.9 RATIO — SIGNIFICANT CHANGE UP
ALPHA1 GLOB SERPL ELPH-MCNC: 0.5 G/DL — HIGH (ref 0.1–0.4)
ALPHA2 GLOB SERPL ELPH-MCNC: 0.8 G/DL — SIGNIFICANT CHANGE UP (ref 0.5–1)
B-GLOBULIN SERPL ELPH-MCNC: 0.9 G/DL — SIGNIFICANT CHANGE UP (ref 0.5–1)
CULTURE RESULTS: SIGNIFICANT CHANGE UP
CULTURE RESULTS: SIGNIFICANT CHANGE UP
DNA PLOIDY SPEC FC-IMP: SIGNIFICANT CHANGE UP
GAMMA GLOBULIN: 1.5 G/DL — SIGNIFICANT CHANGE UP (ref 0.6–1.6)
INTERPRETATION SERPL IFE-IMP: SIGNIFICANT CHANGE UP
PROT PATTERN SERPL ELPH-IMP: SIGNIFICANT CHANGE UP
PTR INTERPRETATION: SIGNIFICANT CHANGE UP
SPECIMEN SOURCE: SIGNIFICANT CHANGE UP
SPECIMEN SOURCE: SIGNIFICANT CHANGE UP

## 2018-02-06 ENCOUNTER — APPOINTMENT (OUTPATIENT)
Dept: INFECTIOUS DISEASE | Facility: CLINIC | Age: 53
End: 2018-02-06
Payer: MEDICARE

## 2018-02-06 VITALS — DIASTOLIC BLOOD PRESSURE: 79 MMHG | SYSTOLIC BLOOD PRESSURE: 141 MMHG

## 2018-02-06 VITALS
RESPIRATION RATE: 18 BRPM | OXYGEN SATURATION: 99 % | BODY MASS INDEX: 20.09 KG/M2 | HEIGHT: 67 IN | HEART RATE: 85 BPM | TEMPERATURE: 97.9 F | WEIGHT: 128 LBS

## 2018-02-06 PROCEDURE — 99213 OFFICE O/P EST LOW 20 MIN: CPT

## 2018-02-21 LAB
CULTURE RESULTS: SIGNIFICANT CHANGE UP
NIGHT BLUE STAIN TISS: SIGNIFICANT CHANGE UP
SPECIMEN SOURCE: SIGNIFICANT CHANGE UP

## 2018-03-02 ENCOUNTER — APPOINTMENT (OUTPATIENT)
Dept: MRI IMAGING | Facility: IMAGING CENTER | Age: 53
End: 2018-03-02
Payer: MEDICARE

## 2018-03-02 ENCOUNTER — OUTPATIENT (OUTPATIENT)
Dept: OUTPATIENT SERVICES | Facility: HOSPITAL | Age: 53
LOS: 1 days | End: 2018-03-02
Payer: MEDICARE

## 2018-03-02 DIAGNOSIS — Z98.891 HISTORY OF UTERINE SCAR FROM PREVIOUS SURGERY: Chronic | ICD-10-CM

## 2018-03-02 DIAGNOSIS — Z98.51 TUBAL LIGATION STATUS: Chronic | ICD-10-CM

## 2018-03-02 DIAGNOSIS — I60.9 NONTRAUMATIC SUBARACHNOID HEMORRHAGE, UNSPECIFIED: ICD-10-CM

## 2018-03-02 PROCEDURE — 70553 MRI BRAIN STEM W/O & W/DYE: CPT | Mod: 26

## 2018-03-02 PROCEDURE — 70544 MR ANGIOGRAPHY HEAD W/O DYE: CPT | Mod: 26,59

## 2018-03-02 PROCEDURE — A9585: CPT

## 2018-03-02 PROCEDURE — 70553 MRI BRAIN STEM W/O & W/DYE: CPT

## 2018-03-02 PROCEDURE — 70544 MR ANGIOGRAPHY HEAD W/O DYE: CPT

## 2018-03-12 ENCOUNTER — APPOINTMENT (OUTPATIENT)
Dept: INFECTIOUS DISEASE | Facility: CLINIC | Age: 53
End: 2018-03-12
Payer: MEDICARE

## 2018-03-12 VITALS
TEMPERATURE: 97.5 F | HEIGHT: 67 IN | HEART RATE: 71 BPM | SYSTOLIC BLOOD PRESSURE: 137 MMHG | OXYGEN SATURATION: 100 % | DIASTOLIC BLOOD PRESSURE: 68 MMHG | RESPIRATION RATE: 18 BRPM | BODY MASS INDEX: 20.56 KG/M2 | WEIGHT: 131 LBS

## 2018-03-12 PROCEDURE — 99213 OFFICE O/P EST LOW 20 MIN: CPT

## 2018-03-14 LAB — ERYTHROCYTE [SEDIMENTATION RATE] IN BLOOD BY WESTERGREN METHOD: 15 MM/HR

## 2018-03-20 LAB
BACTERIA BLD CULT: NORMAL
BACTERIA BLD CULT: NORMAL

## 2018-04-13 ENCOUNTER — OUTPATIENT (OUTPATIENT)
Dept: OUTPATIENT SERVICES | Facility: HOSPITAL | Age: 53
LOS: 1 days | End: 2018-04-13
Payer: MEDICARE

## 2018-04-13 ENCOUNTER — APPOINTMENT (OUTPATIENT)
Dept: CV DIAGNOSITCS | Facility: HOSPITAL | Age: 53
End: 2018-04-13

## 2018-04-13 DIAGNOSIS — I35.0 NONRHEUMATIC AORTIC (VALVE) STENOSIS: ICD-10-CM

## 2018-04-13 DIAGNOSIS — Z98.51 TUBAL LIGATION STATUS: Chronic | ICD-10-CM

## 2018-04-13 DIAGNOSIS — Z98.891 HISTORY OF UTERINE SCAR FROM PREVIOUS SURGERY: Chronic | ICD-10-CM

## 2018-04-13 PROCEDURE — 93306 TTE W/DOPPLER COMPLETE: CPT | Mod: 26

## 2018-04-13 PROCEDURE — 93312 ECHO TRANSESOPHAGEAL: CPT

## 2018-04-13 PROCEDURE — 93306 TTE W/DOPPLER COMPLETE: CPT

## 2018-04-13 PROCEDURE — 76376 3D RENDER W/INTRP POSTPROCES: CPT | Mod: 26

## 2018-04-13 PROCEDURE — 76376 3D RENDER W/INTRP POSTPROCES: CPT

## 2018-04-13 PROCEDURE — 93312 ECHO TRANSESOPHAGEAL: CPT | Mod: 26

## 2018-04-24 ENCOUNTER — APPOINTMENT (OUTPATIENT)
Dept: FAMILY MEDICINE | Facility: CLINIC | Age: 53
End: 2018-04-24
Payer: MEDICARE

## 2018-04-24 VITALS
HEIGHT: 67 IN | DIASTOLIC BLOOD PRESSURE: 70 MMHG | SYSTOLIC BLOOD PRESSURE: 110 MMHG | BODY MASS INDEX: 20.56 KG/M2 | WEIGHT: 131 LBS

## 2018-04-24 PROCEDURE — 99213 OFFICE O/P EST LOW 20 MIN: CPT

## 2018-04-24 RX ORDER — CIPROFLOXACIN HYDROCHLORIDE 250 MG/1
250 TABLET, FILM COATED ORAL
Qty: 10 | Refills: 0 | Status: DISCONTINUED | COMMUNITY
Start: 2017-12-18 | End: 2018-04-24

## 2018-04-26 ENCOUNTER — APPOINTMENT (OUTPATIENT)
Dept: CARDIOTHORACIC SURGERY | Facility: CLINIC | Age: 53
End: 2018-04-26

## 2018-04-29 DIAGNOSIS — R73.09 OTHER ABNORMAL GLUCOSE: ICD-10-CM

## 2018-04-30 LAB
25(OH)D3 SERPL-MCNC: 22.9 NG/ML
ALBUMIN SERPL ELPH-MCNC: 4.1 G/DL
ALP BLD-CCNC: 78 U/L
ALT SERPL-CCNC: 15 U/L
ANION GAP SERPL CALC-SCNC: 12 MMOL/L
AST SERPL-CCNC: 24 U/L
BASOPHILS # BLD AUTO: 0.03 K/UL
BASOPHILS NFR BLD AUTO: 0.5 %
BILIRUB SERPL-MCNC: 0.2 MG/DL
BUN SERPL-MCNC: 19 MG/DL
CALCIUM SERPL-MCNC: 9.3 MG/DL
CHLORIDE SERPL-SCNC: 105 MMOL/L
CHOLEST SERPL-MCNC: 204 MG/DL
CHOLEST/HDLC SERPL: 2.8 RATIO
CO2 SERPL-SCNC: 26 MMOL/L
CREAT SERPL-MCNC: 1.12 MG/DL
EOSINOPHIL # BLD AUTO: 0.11 K/UL
EOSINOPHIL NFR BLD AUTO: 1.8 %
GLUCOSE SERPL-MCNC: 91 MG/DL
HBA1C MFR BLD HPLC: 5.6 %
HCT VFR BLD CALC: 40.6 %
HDLC SERPL-MCNC: 72 MG/DL
HGB BLD-MCNC: 12.8 G/DL
IMM GRANULOCYTES NFR BLD AUTO: 0 %
LDLC SERPL CALC-MCNC: 120 MG/DL
LYMPHOCYTES # BLD AUTO: 2.72 K/UL
LYMPHOCYTES NFR BLD AUTO: 45.3 %
MAN DIFF?: NORMAL
MCHC RBC-ENTMCNC: 28.4 PG
MCHC RBC-ENTMCNC: 31.5 GM/DL
MCV RBC AUTO: 90.2 FL
MONOCYTES # BLD AUTO: 0.47 K/UL
MONOCYTES NFR BLD AUTO: 7.8 %
NEUTROPHILS # BLD AUTO: 2.68 K/UL
NEUTROPHILS NFR BLD AUTO: 44.6 %
PLATELET # BLD AUTO: 185 K/UL
POTASSIUM SERPL-SCNC: 4.2 MMOL/L
PROT SERPL-MCNC: 7.2 G/DL
RBC # BLD: 4.5 M/UL
RBC # FLD: 14.9 %
SODIUM SERPL-SCNC: 143 MMOL/L
TRIGL SERPL-MCNC: 58 MG/DL
TSH SERPL-ACNC: 3.9 UIU/ML
WBC # FLD AUTO: 6.01 K/UL

## 2018-05-22 ENCOUNTER — MEDICATION RENEWAL (OUTPATIENT)
Age: 53
End: 2018-05-22

## 2018-07-18 PROBLEM — I34.0 NONRHEUMATIC MITRAL (VALVE) INSUFFICIENCY: Chronic | Status: ACTIVE | Noted: 2018-01-25

## 2018-08-06 ENCOUNTER — APPOINTMENT (OUTPATIENT)
Dept: FAMILY MEDICINE | Facility: CLINIC | Age: 53
End: 2018-08-06
Payer: MEDICARE

## 2018-08-06 VITALS
RESPIRATION RATE: 18 BRPM | HEART RATE: 66 BPM | DIASTOLIC BLOOD PRESSURE: 70 MMHG | OXYGEN SATURATION: 100 % | BODY MASS INDEX: 20.4 KG/M2 | WEIGHT: 130 LBS | SYSTOLIC BLOOD PRESSURE: 120 MMHG | HEIGHT: 67 IN

## 2018-08-06 PROCEDURE — 36415 COLL VENOUS BLD VENIPUNCTURE: CPT

## 2018-08-06 PROCEDURE — G0402 INITIAL PREVENTIVE EXAM: CPT

## 2018-08-07 LAB
25(OH)D3 SERPL-MCNC: 21.4 NG/ML
ALBUMIN SERPL ELPH-MCNC: 4.8 G/DL
ALP BLD-CCNC: 80 U/L
ALT SERPL-CCNC: 18 U/L
ANION GAP SERPL CALC-SCNC: 16 MMOL/L
APPEARANCE: CLEAR
AST SERPL-CCNC: 24 U/L
BACTERIA: ABNORMAL
BASOPHILS # BLD AUTO: 0.02 K/UL
BASOPHILS NFR BLD AUTO: 0.4 %
BILIRUB SERPL-MCNC: 0.5 MG/DL
BILIRUBIN URINE: NEGATIVE
BLOOD URINE: NEGATIVE
BUN SERPL-MCNC: 14 MG/DL
CALCIUM SERPL-MCNC: 9.8 MG/DL
CHLORIDE SERPL-SCNC: 102 MMOL/L
CHOLEST SERPL-MCNC: 219 MG/DL
CHOLEST/HDLC SERPL: 2.8 RATIO
CO2 SERPL-SCNC: 24 MMOL/L
COLOR: YELLOW
CREAT SERPL-MCNC: 1.11 MG/DL
EOSINOPHIL # BLD AUTO: 0.04 K/UL
EOSINOPHIL NFR BLD AUTO: 0.8 %
GLUCOSE QUALITATIVE U: NEGATIVE MG/DL
GLUCOSE SERPL-MCNC: 94 MG/DL
HBA1C MFR BLD HPLC: 5.8 %
HCT VFR BLD CALC: 43.5 %
HDLC SERPL-MCNC: 79 MG/DL
HGB BLD-MCNC: 13.9 G/DL
HYALINE CASTS: 1 /LPF
IMM GRANULOCYTES NFR BLD AUTO: 0.2 %
KETONES URINE: NEGATIVE
LDLC SERPL CALC-MCNC: 124 MG/DL
LEUKOCYTE ESTERASE URINE: NEGATIVE
LYMPHOCYTES # BLD AUTO: 2.17 K/UL
LYMPHOCYTES NFR BLD AUTO: 40.9 %
MAN DIFF?: NORMAL
MCHC RBC-ENTMCNC: 29.4 PG
MCHC RBC-ENTMCNC: 32 GM/DL
MCV RBC AUTO: 92 FL
MICROSCOPIC-UA: NORMAL
MONOCYTES # BLD AUTO: 0.37 K/UL
MONOCYTES NFR BLD AUTO: 7 %
NEUTROPHILS # BLD AUTO: 2.7 K/UL
NEUTROPHILS NFR BLD AUTO: 50.7 %
NITRITE URINE: NEGATIVE
PH URINE: 5
PLATELET # BLD AUTO: 198 K/UL
POTASSIUM SERPL-SCNC: 4.2 MMOL/L
PROT SERPL-MCNC: 7.7 G/DL
PROTEIN URINE: NEGATIVE MG/DL
RBC # BLD: 4.73 M/UL
RBC # FLD: 15.3 %
RED BLOOD CELLS URINE: 1 /HPF
SODIUM SERPL-SCNC: 142 MMOL/L
SPECIFIC GRAVITY URINE: 1.02
SQUAMOUS EPITHELIAL CELLS: 1 /HPF
TRIGL SERPL-MCNC: 80 MG/DL
TSH SERPL-ACNC: 2.45 UIU/ML
UROBILINOGEN URINE: NEGATIVE MG/DL
WBC # FLD AUTO: 5.31 K/UL
WHITE BLOOD CELLS URINE: 0 /HPF

## 2018-11-05 PROBLEM — R73.09 ELEVATED GLUCOSE LEVEL: Status: ACTIVE | Noted: 2018-04-29

## 2018-12-26 ENCOUNTER — APPOINTMENT (OUTPATIENT)
Dept: INTERNAL MEDICINE | Facility: CLINIC | Age: 53
End: 2018-12-26

## 2019-06-25 ENCOUNTER — APPOINTMENT (OUTPATIENT)
Dept: FAMILY MEDICINE | Facility: CLINIC | Age: 54
End: 2019-06-25
Payer: MEDICARE

## 2019-06-25 ENCOUNTER — NON-APPOINTMENT (OUTPATIENT)
Age: 54
End: 2019-06-25

## 2019-06-25 VITALS
BODY MASS INDEX: 23.07 KG/M2 | DIASTOLIC BLOOD PRESSURE: 86 MMHG | WEIGHT: 147 LBS | HEIGHT: 67 IN | HEART RATE: 62 BPM | OXYGEN SATURATION: 99 % | SYSTOLIC BLOOD PRESSURE: 122 MMHG | RESPIRATION RATE: 16 BRPM

## 2019-06-25 LAB
APPEARANCE: CLEAR
BACTERIA: NEGATIVE
BILIRUBIN URINE: NEGATIVE
BLOOD URINE: NEGATIVE
COLOR: NORMAL
GLUCOSE QUALITATIVE U: NEGATIVE
HYALINE CASTS: 0 /LPF
KETONES URINE: NEGATIVE
LEUKOCYTE ESTERASE URINE: NEGATIVE
MICROSCOPIC-UA: NORMAL
NITRITE URINE: NEGATIVE
PH URINE: 6
PROTEIN URINE: NEGATIVE
RED BLOOD CELLS URINE: 0 /HPF
SPECIFIC GRAVITY URINE: 1.01
SQUAMOUS EPITHELIAL CELLS: 4 /HPF
UROBILINOGEN URINE: NORMAL
WHITE BLOOD CELLS URINE: 1 /HPF

## 2019-06-25 PROCEDURE — 36415 COLL VENOUS BLD VENIPUNCTURE: CPT

## 2019-06-25 PROCEDURE — 93000 ELECTROCARDIOGRAM COMPLETE: CPT

## 2019-06-25 PROCEDURE — G0438: CPT

## 2019-06-26 LAB
25(OH)D3 SERPL-MCNC: 21.6 NG/ML
ALBUMIN SERPL ELPH-MCNC: 4.7 G/DL
ALP BLD-CCNC: 86 U/L
ALT SERPL-CCNC: 19 U/L
ANION GAP SERPL CALC-SCNC: 13 MMOL/L
AST SERPL-CCNC: 24 U/L
BASOPHILS # BLD AUTO: 0.04 K/UL
BASOPHILS NFR BLD AUTO: 0.7 %
BILIRUB SERPL-MCNC: 0.4 MG/DL
BUN SERPL-MCNC: 17 MG/DL
CALCIUM SERPL-MCNC: 9.8 MG/DL
CHLORIDE SERPL-SCNC: 102 MMOL/L
CHOLEST SERPL-MCNC: 201 MG/DL
CHOLEST/HDLC SERPL: 3 RATIO
CO2 SERPL-SCNC: 25 MMOL/L
CREAT SERPL-MCNC: 1.07 MG/DL
EOSINOPHIL # BLD AUTO: 0.09 K/UL
EOSINOPHIL NFR BLD AUTO: 1.6 %
ESTIMATED AVERAGE GLUCOSE: 117 MG/DL
GLUCOSE SERPL-MCNC: 86 MG/DL
HBA1C MFR BLD HPLC: 5.7 %
HCT VFR BLD CALC: 44.3 %
HDLC SERPL-MCNC: 68 MG/DL
HGB BLD-MCNC: 13.7 G/DL
IMM GRANULOCYTES NFR BLD AUTO: 0.4 %
LDLC SERPL CALC-MCNC: 118 MG/DL
LYMPHOCYTES # BLD AUTO: 2.07 K/UL
LYMPHOCYTES NFR BLD AUTO: 36.4 %
MAN DIFF?: NORMAL
MCHC RBC-ENTMCNC: 28.8 PG
MCHC RBC-ENTMCNC: 30.9 GM/DL
MCV RBC AUTO: 93.3 FL
MONOCYTES # BLD AUTO: 0.59 K/UL
MONOCYTES NFR BLD AUTO: 10.4 %
NEUTROPHILS # BLD AUTO: 2.88 K/UL
NEUTROPHILS NFR BLD AUTO: 50.5 %
PLATELET # BLD AUTO: 136 K/UL
POTASSIUM SERPL-SCNC: 4.2 MMOL/L
PROT SERPL-MCNC: 7.6 G/DL
RBC # BLD: 4.75 M/UL
RBC # FLD: 13.9 %
SODIUM SERPL-SCNC: 140 MMOL/L
TRIGL SERPL-MCNC: 75 MG/DL
TSH SERPL-ACNC: 3.93 UIU/ML
WBC # FLD AUTO: 5.69 K/UL

## 2019-07-02 ENCOUNTER — TRANSCRIPTION ENCOUNTER (OUTPATIENT)
Age: 54
End: 2019-07-02

## 2019-07-05 ENCOUNTER — APPOINTMENT (OUTPATIENT)
Dept: CARDIOLOGY | Facility: CLINIC | Age: 54
End: 2019-07-05
Payer: MEDICARE

## 2019-07-05 VITALS
BODY MASS INDEX: 22.76 KG/M2 | SYSTOLIC BLOOD PRESSURE: 117 MMHG | WEIGHT: 145 LBS | DIASTOLIC BLOOD PRESSURE: 65 MMHG | OXYGEN SATURATION: 98 % | HEART RATE: 68 BPM | HEIGHT: 67 IN

## 2019-07-05 DIAGNOSIS — I34.0 NONRHEUMATIC MITRAL (VALVE) INSUFFICIENCY: ICD-10-CM

## 2019-07-05 PROCEDURE — 99204 OFFICE O/P NEW MOD 45 MIN: CPT

## 2019-07-05 NOTE — HISTORY OF PRESENT ILLNESS
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 Pt feels well. Denies shortness of breath with regular activity. Denies, fevers, chills, dizziness, lightheadedness. Saw Dr. Hein for surgical consultation April 2018 and was recommended for MVR but did not follow up. Had been on aspirin and plavix in the past for her stents but D/c'ed due to GI bleeding and gastric ulcers. \par \par Never smoker.  Used to work as an .  Parents had HTN with resultant CVA. \par \par ECG: SR with LAE\par [place] 4/2018: Normal LV, mod LAE (44), flail anterior leaflet (A2-A3), severe posteriorly directed MR, pulmonary vein reversal\par \par Lipitor 10mg

## 2019-07-05 NOTE — PHYSICAL EXAM
[Normal Appearance] : normal appearance [General Appearance - Well Developed] : well developed [Well Groomed] : well groomed [General Appearance - Well Nourished] : well nourished [No Deformities] : no deformities [General Appearance - In No Acute Distress] : no acute distress [Normal Conjunctiva] : the conjunctiva exhibited no abnormalities [Eyelids - No Xanthelasma] : the eyelids demonstrated no xanthelasmas [Normal Oral Mucosa] : normal oral mucosa [No Oral Cyanosis] : no oral cyanosis [No Oral Pallor] : no oral pallor [Normal Jugular Venous A Waves Present] : normal jugular venous A waves present [No Jugular Venous Constantino A Waves] : no jugular venous constantino A waves [Normal Jugular Venous V Waves Present] : normal jugular venous V waves present [Heart Rate And Rhythm] : heart rate and rhythm were normal [Heart Sounds] : normal S1 and S2 [Arterial Pulses Normal] : the arterial pulses were normal [FreeTextEntry1] : 3/6 harsh systolic murmur throughout precordium [Edema] : no peripheral edema present [Respiration, Rhythm And Depth] : normal respiratory rhythm and effort [Exaggerated Use Of Accessory Muscles For Inspiration] : no accessory muscle use [Auscultation Breath Sounds / Voice Sounds] : lungs were clear to auscultation bilaterally [Abdomen Soft] : soft [Abdomen Tenderness] : non-tender [Abnormal Walk] : normal gait [Abdomen Mass (___ Cm)] : no abdominal mass palpated [Nail Clubbing] : no clubbing of the fingernails [Gait - Sufficient For Exercise Testing] : the gait was sufficient for exercise testing [Cyanosis, Localized] : no localized cyanosis [Petechial Hemorrhages (___cm)] : no petechial hemorrhages [Skin Color & Pigmentation] : normal skin color and pigmentation [No Venous Stasis] : no venous stasis [] : no rash [No Skin Ulcers] : no skin ulcer [Skin Lesions] : no skin lesions [Oriented To Time, Place, And Person] : oriented to person, place, and time [No Xanthoma] : no  xanthoma was observed [No Anxiety] : not feeling anxious [Affect] : the affect was normal [Mood] : the mood was normal

## 2019-07-05 NOTE — DISCUSSION/SUMMARY
[FreeTextEntry1] : 53F with\par \par 1. Severe MR: MVP with flail anterior leaflet. Posteriorly directed MR. Repeat echo.  Was told by CT Sx she needed MVR in 4/2018.  Assess LV size and function on TTE.  Asymptomatic. Abx before dental work given hx of endocarditis\par \par 2. CVA: Cont lipitor, cannot tolerate DAPT\par

## 2019-07-05 NOTE — REVIEW OF SYSTEMS
[Fever] : no fever [Shortness Of Breath] : no shortness of breath [Chills] : no chills [Cough] : no cough [Chest Pain] : no chest pain [Palpitations] : no palpitations [Abdominal Pain] : no abdominal pain [Heartburn] : no heartburn [Skin: A Rash] : no rash: [Skin Lesions] : no skin lesions [Anxiety] : no anxiety

## 2019-07-22 ENCOUNTER — APPOINTMENT (OUTPATIENT)
Dept: INTERNAL MEDICINE | Facility: CLINIC | Age: 54
End: 2019-07-22
Payer: MEDICARE

## 2019-07-22 VITALS
DIASTOLIC BLOOD PRESSURE: 78 MMHG | TEMPERATURE: 98.2 F | HEART RATE: 65 BPM | HEIGHT: 67 IN | OXYGEN SATURATION: 97 % | WEIGHT: 145 LBS | SYSTOLIC BLOOD PRESSURE: 118 MMHG | BODY MASS INDEX: 22.76 KG/M2

## 2019-07-22 DIAGNOSIS — K25.0 ACUTE GASTRIC ULCER WITH HEMORRHAGE: ICD-10-CM

## 2019-07-22 PROCEDURE — 99214 OFFICE O/P EST MOD 30 MIN: CPT

## 2019-07-22 RX ORDER — OMEPRAZOLE 20 MG/1
20 TABLET, DELAYED RELEASE ORAL
Qty: 30 | Refills: 0 | Status: DISCONTINUED | COMMUNITY
Start: 2017-12-18 | End: 2019-07-22

## 2019-07-22 NOTE — PHYSICAL EXAM
[Outer Ear] : the ears and nose were normal in appearance [General Appearance - Alert] : alert [Neck Appearance] : the appearance of the neck was normal [Both Tympanic Membranes Were Examined] : both tympanic membranes were normal [Respiration, Rhythm And Depth] : normal respiratory rhythm and effort [Apical Impulse] : the apical impulse was normal [Heart Rate And Rhythm] : heart rate was normal and rhythm regular [Abdominal Aorta] : the abdominal aorta was normal [FreeTextEntry1] : loud murmur [Full Pulse] : the pedal pulses are present [Abdomen Soft] : soft [] : no hepato-splenomegaly [Motor Tone] : muscle strength and tone were normal [Musculoskeletal - Swelling] : no joint swelling seen [Skin Lesions] : no skin lesions [Skin Turgor] : normal skin turgor [Deep Tendon Reflexes (DTR)] : deep tendon reflexes were 2+ and symmetric [Motor Exam] : the motor exam was normal

## 2019-07-22 NOTE — ASSESSMENT
[FreeTextEntry1] : history of silent ulcer disease with hemorrhage from aspirin and plavix\par never had colon\par hx of sbe and flail mitral valve\par Require antibiotic\par \par Should have screening colon and an upper endoscopy

## 2019-07-23 ENCOUNTER — APPOINTMENT (OUTPATIENT)
Dept: INTERNAL MEDICINE | Facility: CLINIC | Age: 54
End: 2019-07-23
Payer: MEDICARE

## 2019-07-23 PROCEDURE — 93306 TTE W/DOPPLER COMPLETE: CPT

## 2019-08-01 ENCOUNTER — MEDICATION RENEWAL (OUTPATIENT)
Age: 54
End: 2019-08-01

## 2019-08-15 ENCOUNTER — LABORATORY RESULT (OUTPATIENT)
Age: 54
End: 2019-08-15

## 2019-08-15 ENCOUNTER — APPOINTMENT (OUTPATIENT)
Dept: INTERNAL MEDICINE | Facility: CLINIC | Age: 54
End: 2019-08-15
Payer: MEDICARE

## 2019-08-15 DIAGNOSIS — Z12.11 ENCOUNTER FOR SCREENING FOR MALIGNANT NEOPLASM OF COLON: ICD-10-CM

## 2019-08-15 DIAGNOSIS — K31.9 DISEASE OF STOMACH AND DUODENUM, UNSPECIFIED: ICD-10-CM

## 2019-08-15 DIAGNOSIS — K44.9 DIAPHRAGMATIC HERNIA W/OUT OBSTRUCTION OR GANGRENE: ICD-10-CM

## 2019-08-15 PROCEDURE — 43239 EGD BIOPSY SINGLE/MULTIPLE: CPT | Mod: 52

## 2019-08-15 PROCEDURE — G0121 COLON CA SCRN NOT HI RSK IND: CPT

## 2019-08-16 ENCOUNTER — FORM ENCOUNTER (OUTPATIENT)
Age: 54
End: 2019-08-16

## 2019-08-17 ENCOUNTER — APPOINTMENT (OUTPATIENT)
Dept: MAMMOGRAPHY | Facility: IMAGING CENTER | Age: 54
End: 2019-08-17
Payer: MEDICARE

## 2019-08-17 ENCOUNTER — OUTPATIENT (OUTPATIENT)
Dept: OUTPATIENT SERVICES | Facility: HOSPITAL | Age: 54
LOS: 1 days | End: 2019-08-17
Payer: MEDICARE

## 2019-08-17 DIAGNOSIS — Z00.00 ENCOUNTER FOR GENERAL ADULT MEDICAL EXAMINATION WITHOUT ABNORMAL FINDINGS: ICD-10-CM

## 2019-08-17 DIAGNOSIS — Z98.891 HISTORY OF UTERINE SCAR FROM PREVIOUS SURGERY: Chronic | ICD-10-CM

## 2019-08-17 DIAGNOSIS — Z98.51 TUBAL LIGATION STATUS: Chronic | ICD-10-CM

## 2019-08-17 PROCEDURE — 77063 BREAST TOMOSYNTHESIS BI: CPT | Mod: 26

## 2019-08-17 PROCEDURE — 77063 BREAST TOMOSYNTHESIS BI: CPT

## 2019-08-17 PROCEDURE — 77067 SCR MAMMO BI INCL CAD: CPT | Mod: 26

## 2019-08-17 PROCEDURE — 77067 SCR MAMMO BI INCL CAD: CPT

## 2019-08-27 ENCOUNTER — RESULT REVIEW (OUTPATIENT)
Age: 54
End: 2019-08-27

## 2019-10-02 NOTE — PATIENT PROFILE ADULT. - NS PRO MODE OF ARRIVAL
Patient has the following symptoms: fluttering in heart  The symptoms have been present for the last couple days    Please advise.   stretcher

## 2019-10-04 ENCOUNTER — APPOINTMENT (OUTPATIENT)
Dept: CARDIOLOGY | Facility: CLINIC | Age: 54
End: 2019-10-04
Payer: MEDICARE

## 2019-10-04 VITALS
HEART RATE: 72 BPM | DIASTOLIC BLOOD PRESSURE: 68 MMHG | HEIGHT: 67 IN | TEMPERATURE: 98.1 F | WEIGHT: 145 LBS | BODY MASS INDEX: 22.76 KG/M2 | OXYGEN SATURATION: 98 % | SYSTOLIC BLOOD PRESSURE: 119 MMHG

## 2019-10-04 DIAGNOSIS — I34.1 NONRHEUMATIC MITRAL (VALVE) PROLAPSE: ICD-10-CM

## 2019-10-04 PROCEDURE — 99214 OFFICE O/P EST MOD 30 MIN: CPT

## 2019-10-04 NOTE — PHYSICAL EXAM
[General Appearance - Well Developed] : well developed [Normal Appearance] : normal appearance [Well Groomed] : well groomed [General Appearance - Well Nourished] : well nourished [No Deformities] : no deformities [General Appearance - In No Acute Distress] : no acute distress [Normal Conjunctiva] : the conjunctiva exhibited no abnormalities [Eyelids - No Xanthelasma] : the eyelids demonstrated no xanthelasmas [Normal Oral Mucosa] : normal oral mucosa [No Oral Pallor] : no oral pallor [No Oral Cyanosis] : no oral cyanosis [Normal Jugular Venous A Waves Present] : normal jugular venous A waves present [Normal Jugular Venous V Waves Present] : normal jugular venous V waves present [No Jugular Venous Constantino A Waves] : no jugular venous constantino A waves [Respiration, Rhythm And Depth] : normal respiratory rhythm and effort [Exaggerated Use Of Accessory Muscles For Inspiration] : no accessory muscle use [Auscultation Breath Sounds / Voice Sounds] : lungs were clear to auscultation bilaterally [Heart Rate And Rhythm] : heart rate and rhythm were normal [Heart Sounds] : normal S1 and S2 [Arterial Pulses Normal] : the arterial pulses were normal [Edema] : no peripheral edema present [Abdomen Soft] : soft [Abdomen Tenderness] : non-tender [Abdomen Mass (___ Cm)] : no abdominal mass palpated [Abnormal Walk] : normal gait [Gait - Sufficient For Exercise Testing] : the gait was sufficient for exercise testing [Nail Clubbing] : no clubbing of the fingernails [Cyanosis, Localized] : no localized cyanosis [Petechial Hemorrhages (___cm)] : no petechial hemorrhages [Skin Color & Pigmentation] : normal skin color and pigmentation [] : no rash [No Venous Stasis] : no venous stasis [Skin Lesions] : no skin lesions [No Skin Ulcers] : no skin ulcer [No Xanthoma] : no  xanthoma was observed [Oriented To Time, Place, And Person] : oriented to person, place, and time [Affect] : the affect was normal [Mood] : the mood was normal [No Anxiety] : not feeling anxious [FreeTextEntry1] : Brace on L hand (from CVA)

## 2019-10-04 NOTE — REVIEW OF SYSTEMS
[Fever] : no fever [Chills] : no chills [Chest Pain] : no chest pain [Shortness Of Breath] : no shortness of breath [Palpitations] : no palpitations [Cough] : no cough [Heartburn] : no heartburn [Abdominal Pain] : no abdominal pain [Skin: A Rash] : no rash: [Anxiety] : no anxiety [Skin Lesions] : no skin lesions

## 2019-10-04 NOTE — HISTORY OF PRESENT ILLNESS
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 \par \par Saw Dr. Hein for surgical consultation April 2018 and was recommended for MVR but did not follow up. Had been on aspirin and plavix in the past for her stents but D/c'ed due to GI bleeding and gastric ulcers. \par \par Never smoker.  Used to work as an .  Parents had HTN with resultant CVA. \par \par ECG: SR with LAE\par [place] 4/2018: Normal LV, mod LAE (44), flail anterior leaflet (A2-A3), severe posteriorly directed MR, pulmonary vein reversal\par TTE: 7/2019: EF 65-70%, severe LAE, anterior MVL with severe posteriorly directed MR, borderline pHTN (35.8 mmHg)\par \par Lipitor 10mg

## 2019-10-04 NOTE — DISCUSSION/SUMMARY
[FreeTextEntry1] : 53F with\par \par 1. Severe MR: MVP with flail anterior leaflet. Posteriorly directed MR. Unchanged on echo. Was told by CT Sx she needed MVR in 4/2018 but she is not ready for surgery yet. \par \par As she remains asymptomatic with normal LV size and function she does not yet require MV repair. \par Will need very close monitoring with q6 month echocardiograms\par  Abx before dental work given hx of endocarditis\par Strict BP control, BP excellent today\par \par 2. CVA: Cont lipitor, cannot tolerate DAPT\par \par RV 3 months, echo in 1/2020\par

## 2019-12-04 ENCOUNTER — APPOINTMENT (OUTPATIENT)
Dept: FAMILY MEDICINE | Facility: CLINIC | Age: 54
End: 2019-12-04
Payer: MEDICARE

## 2019-12-04 VITALS
HEIGHT: 67 IN | DIASTOLIC BLOOD PRESSURE: 70 MMHG | OXYGEN SATURATION: 98 % | SYSTOLIC BLOOD PRESSURE: 100 MMHG | HEART RATE: 68 BPM | TEMPERATURE: 98.6 F

## 2019-12-04 DIAGNOSIS — Z87.09 PERSONAL HISTORY OF OTHER DISEASES OF THE RESPIRATORY SYSTEM: ICD-10-CM

## 2019-12-04 PROCEDURE — 99213 OFFICE O/P EST LOW 20 MIN: CPT

## 2019-12-04 RX ORDER — POLYETHYLENE GLYCOL-3350 AND ELECTROLYTES 236; 6.74; 5.86; 2.97; 22.74 G/274.31G; G/274.31G; G/274.31G; G/274.31G; G/274.31G
236 POWDER, FOR SOLUTION ORAL
Qty: 1 | Refills: 0 | Status: DISCONTINUED | COMMUNITY
Start: 2019-08-01 | End: 2019-12-04

## 2019-12-04 NOTE — PHYSICAL EXAM
[Well Developed] : well developed [Normal Gait] : normal gait [de-identified] : posterior cobblestoning [Normal Insight/Judgement] : insight and judgment were intact [Normal Affect] : the affect was normal

## 2019-12-04 NOTE — HISTORY OF PRESENT ILLNESS
[FreeTextEntry8] : 54 year old female here with complaints of froggy voice. Patients active medications, allergies and issues were all reviewed with the patient at time of visit.\par

## 2019-12-16 ENCOUNTER — APPOINTMENT (OUTPATIENT)
Dept: FAMILY MEDICINE | Facility: CLINIC | Age: 54
End: 2019-12-16
Payer: MEDICARE

## 2019-12-16 VITALS
BODY MASS INDEX: 22.76 KG/M2 | SYSTOLIC BLOOD PRESSURE: 120 MMHG | HEIGHT: 67 IN | OXYGEN SATURATION: 98 % | TEMPERATURE: 97.9 F | HEART RATE: 66 BPM | DIASTOLIC BLOOD PRESSURE: 70 MMHG | WEIGHT: 145 LBS

## 2019-12-16 DIAGNOSIS — Z23 ENCOUNTER FOR IMMUNIZATION: ICD-10-CM

## 2019-12-16 LAB
25(OH)D3 SERPL-MCNC: 18.8 NG/ML
ALBUMIN SERPL ELPH-MCNC: 4.6 G/DL
ALP BLD-CCNC: 88 U/L
ALT SERPL-CCNC: 21 U/L
ANION GAP SERPL CALC-SCNC: 14 MMOL/L
AST SERPL-CCNC: 26 U/L
BASOPHILS # BLD AUTO: 0.05 K/UL
BASOPHILS NFR BLD AUTO: 0.9 %
BILIRUB SERPL-MCNC: 0.3 MG/DL
BUN SERPL-MCNC: 17 MG/DL
CALCIUM SERPL-MCNC: 9.9 MG/DL
CHLORIDE SERPL-SCNC: 102 MMOL/L
CHOLEST SERPL-MCNC: 209 MG/DL
CHOLEST/HDLC SERPL: 3.2 RATIO
CO2 SERPL-SCNC: 26 MMOL/L
CREAT SERPL-MCNC: 1.15 MG/DL
EOSINOPHIL # BLD AUTO: 0.07 K/UL
EOSINOPHIL NFR BLD AUTO: 1.2 %
GLUCOSE SERPL-MCNC: 90 MG/DL
HCT VFR BLD CALC: 46.8 %
HDLC SERPL-MCNC: 65 MG/DL
HGB BLD-MCNC: 14.3 G/DL
IMM GRANULOCYTES NFR BLD AUTO: 0.2 %
LDLC SERPL CALC-MCNC: 126 MG/DL
LYMPHOCYTES # BLD AUTO: 2.62 K/UL
LYMPHOCYTES NFR BLD AUTO: 44.8 %
MAN DIFF?: NORMAL
MCHC RBC-ENTMCNC: 29.3 PG
MCHC RBC-ENTMCNC: 30.6 GM/DL
MCV RBC AUTO: 95.9 FL
MONOCYTES # BLD AUTO: 0.67 K/UL
MONOCYTES NFR BLD AUTO: 11.5 %
NEUTROPHILS # BLD AUTO: 2.43 K/UL
NEUTROPHILS NFR BLD AUTO: 41.4 %
PLATELET # BLD AUTO: 200 K/UL
POTASSIUM SERPL-SCNC: 4.8 MMOL/L
PROT SERPL-MCNC: 7.4 G/DL
RBC # BLD: 4.88 M/UL
RBC # FLD: 14.5 %
SODIUM SERPL-SCNC: 141 MMOL/L
TRIGL SERPL-MCNC: 91 MG/DL
TSH SERPL-ACNC: 3.33 UIU/ML
WBC # FLD AUTO: 5.85 K/UL

## 2019-12-16 PROCEDURE — 36415 COLL VENOUS BLD VENIPUNCTURE: CPT

## 2019-12-16 PROCEDURE — 90686 IIV4 VACC NO PRSV 0.5 ML IM: CPT

## 2019-12-16 PROCEDURE — 99214 OFFICE O/P EST MOD 30 MIN: CPT | Mod: 25

## 2019-12-16 PROCEDURE — G0008: CPT

## 2020-01-17 ENCOUNTER — APPOINTMENT (OUTPATIENT)
Dept: CARDIOLOGY | Facility: CLINIC | Age: 55
End: 2020-01-17

## 2020-07-21 ENCOUNTER — RX RENEWAL (OUTPATIENT)
Age: 55
End: 2020-07-21

## 2020-09-01 ENCOUNTER — APPOINTMENT (OUTPATIENT)
Dept: FAMILY MEDICINE | Facility: CLINIC | Age: 55
End: 2020-09-01
Payer: MEDICARE

## 2020-09-01 VITALS
TEMPERATURE: 98.4 F | HEART RATE: 69 BPM | OXYGEN SATURATION: 98 % | BODY MASS INDEX: 23.39 KG/M2 | DIASTOLIC BLOOD PRESSURE: 90 MMHG | HEIGHT: 67 IN | WEIGHT: 149 LBS | SYSTOLIC BLOOD PRESSURE: 120 MMHG | RESPIRATION RATE: 16 BRPM

## 2020-09-01 DIAGNOSIS — I34.0 NONRHEUMATIC MITRAL (VALVE) INSUFFICIENCY: ICD-10-CM

## 2020-09-01 PROCEDURE — 36415 COLL VENOUS BLD VENIPUNCTURE: CPT

## 2020-09-01 PROCEDURE — G0439: CPT

## 2020-09-02 LAB
25(OH)D3 SERPL-MCNC: 19.2 NG/ML
ALBUMIN SERPL ELPH-MCNC: 5 G/DL
ALP BLD-CCNC: 76 U/L
ALT SERPL-CCNC: 18 U/L
ANION GAP SERPL CALC-SCNC: 13 MMOL/L
APPEARANCE: CLEAR
AST SERPL-CCNC: 14 U/L
BACTERIA: NEGATIVE
BASOPHILS # BLD AUTO: 0.05 K/UL
BASOPHILS NFR BLD AUTO: 0.8 %
BILIRUB SERPL-MCNC: 0.9 MG/DL
BILIRUBIN URINE: NEGATIVE
BLOOD URINE: NEGATIVE
BUN SERPL-MCNC: 11 MG/DL
CALCIUM SERPL-MCNC: 9.9 MG/DL
CHLORIDE SERPL-SCNC: 99 MMOL/L
CHOLEST SERPL-MCNC: 136 MG/DL
CHOLEST/HDLC SERPL: 2.4 RATIO
CO2 SERPL-SCNC: 26 MMOL/L
COLOR: COLORLESS
CREAT SERPL-MCNC: 1.16 MG/DL
EOSINOPHIL # BLD AUTO: 0.07 K/UL
EOSINOPHIL NFR BLD AUTO: 1.2 %
ESTIMATED AVERAGE GLUCOSE: 120 MG/DL
GLUCOSE QUALITATIVE U: NEGATIVE
GLUCOSE SERPL-MCNC: 88 MG/DL
HBA1C MFR BLD HPLC: 5.8 %
HCT VFR BLD CALC: 41.1 %
HDLC SERPL-MCNC: 57 MG/DL
HGB BLD-MCNC: 13.1 G/DL
HYALINE CASTS: 0 /LPF
IMM GRANULOCYTES NFR BLD AUTO: 0.2 %
KETONES URINE: NEGATIVE
LDLC SERPL CALC-MCNC: 71 MG/DL
LEUKOCYTE ESTERASE URINE: NEGATIVE
LYMPHOCYTES # BLD AUTO: 2.16 K/UL
LYMPHOCYTES NFR BLD AUTO: 36.3 %
MAN DIFF?: NORMAL
MCHC RBC-ENTMCNC: 29.4 PG
MCHC RBC-ENTMCNC: 31.9 GM/DL
MCV RBC AUTO: 92.4 FL
MICROSCOPIC-UA: NORMAL
MONOCYTES # BLD AUTO: 0.63 K/UL
MONOCYTES NFR BLD AUTO: 10.6 %
NEUTROPHILS # BLD AUTO: 3.03 K/UL
NEUTROPHILS NFR BLD AUTO: 50.9 %
NITRITE URINE: NEGATIVE
PH URINE: 5.5
PLATELET # BLD AUTO: 174 K/UL
POTASSIUM SERPL-SCNC: 4.2 MMOL/L
PROT SERPL-MCNC: 7.2 G/DL
PROTEIN URINE: NEGATIVE
RBC # BLD: 4.45 M/UL
RBC # FLD: 14 %
RED BLOOD CELLS URINE: 0 /HPF
SARS-COV-2 IGG SERPL IA-ACNC: <0.1 INDEX
SARS-COV-2 IGG SERPL QL IA: NEGATIVE
SODIUM SERPL-SCNC: 138 MMOL/L
SPECIFIC GRAVITY URINE: 1.01
SQUAMOUS EPITHELIAL CELLS: 1 /HPF
TRIGL SERPL-MCNC: 40 MG/DL
TSH SERPL-ACNC: 1.88 UIU/ML
UROBILINOGEN URINE: NORMAL
WBC # FLD AUTO: 5.95 K/UL
WHITE BLOOD CELLS URINE: 1 /HPF

## 2020-12-21 PROBLEM — Z87.09 HISTORY OF LARYNGITIS: Status: RESOLVED | Noted: 2019-12-04 | Resolved: 2020-12-21

## 2021-03-10 ENCOUNTER — APPOINTMENT (OUTPATIENT)
Dept: FAMILY MEDICINE | Facility: CLINIC | Age: 56
End: 2021-03-10

## 2021-03-16 ENCOUNTER — APPOINTMENT (OUTPATIENT)
Dept: FAMILY MEDICINE | Facility: CLINIC | Age: 56
End: 2021-03-16
Payer: MEDICARE

## 2021-03-16 VITALS
BODY MASS INDEX: 23.54 KG/M2 | DIASTOLIC BLOOD PRESSURE: 70 MMHG | OXYGEN SATURATION: 98 % | WEIGHT: 150 LBS | HEART RATE: 70 BPM | SYSTOLIC BLOOD PRESSURE: 120 MMHG | TEMPERATURE: 98.2 F | HEIGHT: 67 IN

## 2021-03-16 PROCEDURE — 99072 ADDL SUPL MATRL&STAF TM PHE: CPT

## 2021-03-16 PROCEDURE — 99214 OFFICE O/P EST MOD 30 MIN: CPT | Mod: 25

## 2021-03-16 PROCEDURE — 36415 COLL VENOUS BLD VENIPUNCTURE: CPT

## 2021-03-16 RX ORDER — FLUTICASONE PROPIONATE 50 UG/1
50 SPRAY, METERED NASAL TWICE DAILY
Qty: 1 | Refills: 5 | Status: DISCONTINUED | COMMUNITY
Start: 2019-12-04 | End: 2021-03-16

## 2021-03-16 RX ORDER — SODIUM SULFATE, POTASSIUM SULFATE, MAGNESIUM SULFATE 17.5; 3.13; 1.6 G/ML; G/ML; G/ML
17.5-3.13-1.6 SOLUTION, CONCENTRATE ORAL
Qty: 1 | Refills: 0 | Status: DISCONTINUED | COMMUNITY
Start: 2019-08-01 | End: 2021-03-16

## 2021-03-16 RX ORDER — PREDNISONE 20 MG/1
20 TABLET ORAL
Qty: 12 | Refills: 0 | Status: DISCONTINUED | COMMUNITY
Start: 2019-12-04 | End: 2021-03-16

## 2021-03-17 LAB
ALBUMIN SERPL ELPH-MCNC: 4.2 G/DL
ALP BLD-CCNC: 88 U/L
ALT SERPL-CCNC: 11 U/L
ANION GAP SERPL CALC-SCNC: 10 MMOL/L
AST SERPL-CCNC: 20 U/L
BASOPHILS # BLD AUTO: 0.05 K/UL
BASOPHILS NFR BLD AUTO: 0.8 %
BILIRUB SERPL-MCNC: 0.3 MG/DL
BUN SERPL-MCNC: 11 MG/DL
CALCIUM SERPL-MCNC: 9.3 MG/DL
CHLORIDE SERPL-SCNC: 105 MMOL/L
CHOLEST SERPL-MCNC: 205 MG/DL
CO2 SERPL-SCNC: 26 MMOL/L
CREAT SERPL-MCNC: 1.1 MG/DL
EOSINOPHIL # BLD AUTO: 0.12 K/UL
EOSINOPHIL NFR BLD AUTO: 1.9 %
GLUCOSE SERPL-MCNC: 91 MG/DL
HCT VFR BLD CALC: 39.2 %
HDLC SERPL-MCNC: 61 MG/DL
HGB BLD-MCNC: 12.9 G/DL
IMM GRANULOCYTES NFR BLD AUTO: 0.2 %
LDLC SERPL CALC-MCNC: 130 MG/DL
LYMPHOCYTES # BLD AUTO: 3.3 K/UL
LYMPHOCYTES NFR BLD AUTO: 51 %
MAN DIFF?: NORMAL
MCHC RBC-ENTMCNC: 29.5 PG
MCHC RBC-ENTMCNC: 32.9 GM/DL
MCV RBC AUTO: 89.7 FL
MONOCYTES # BLD AUTO: 0.52 K/UL
MONOCYTES NFR BLD AUTO: 8 %
NEUTROPHILS # BLD AUTO: 2.47 K/UL
NEUTROPHILS NFR BLD AUTO: 38.1 %
NONHDLC SERPL-MCNC: 144 MG/DL
PLATELET # BLD AUTO: 196 K/UL
POTASSIUM SERPL-SCNC: 3.9 MMOL/L
PROT SERPL-MCNC: 6.9 G/DL
RBC # BLD: 4.37 M/UL
RBC # FLD: 13.8 %
SODIUM SERPL-SCNC: 140 MMOL/L
TRIGL SERPL-MCNC: 69 MG/DL
TSH SERPL-ACNC: 6.68 UIU/ML
WBC # FLD AUTO: 6.47 K/UL

## 2021-04-06 ENCOUNTER — RESULT REVIEW (OUTPATIENT)
Age: 56
End: 2021-04-06

## 2021-05-03 ENCOUNTER — APPOINTMENT (OUTPATIENT)
Dept: FAMILY MEDICINE | Facility: CLINIC | Age: 56
End: 2021-05-03
Payer: MEDICARE

## 2021-05-03 VITALS
HEIGHT: 67 IN | DIASTOLIC BLOOD PRESSURE: 72 MMHG | BODY MASS INDEX: 23.54 KG/M2 | SYSTOLIC BLOOD PRESSURE: 130 MMHG | RESPIRATION RATE: 16 BRPM | TEMPERATURE: 97.5 F | HEART RATE: 67 BPM | WEIGHT: 150 LBS | OXYGEN SATURATION: 97 %

## 2021-05-03 DIAGNOSIS — I60.9 NONTRAUMATIC SUBARACHNOID HEMORRHAGE, UNSPECIFIED: ICD-10-CM

## 2021-05-03 PROCEDURE — 99213 OFFICE O/P EST LOW 20 MIN: CPT | Mod: 25

## 2021-05-03 PROCEDURE — 36415 COLL VENOUS BLD VENIPUNCTURE: CPT

## 2021-05-03 PROCEDURE — 99072 ADDL SUPL MATRL&STAF TM PHE: CPT

## 2021-05-04 LAB
ALBUMIN SERPL ELPH-MCNC: 4.1 G/DL
ALP BLD-CCNC: 74 U/L
ALT SERPL-CCNC: 14 U/L
ANION GAP SERPL CALC-SCNC: 11 MMOL/L
AST SERPL-CCNC: 23 U/L
BASOPHILS # BLD AUTO: 0.05 K/UL
BASOPHILS NFR BLD AUTO: 0.9 %
BILIRUB SERPL-MCNC: 0.4 MG/DL
BUN SERPL-MCNC: 16 MG/DL
CALCIUM SERPL-MCNC: 9.8 MG/DL
CHLORIDE SERPL-SCNC: 104 MMOL/L
CHOLEST SERPL-MCNC: 197 MG/DL
CO2 SERPL-SCNC: 25 MMOL/L
CREAT SERPL-MCNC: 1.15 MG/DL
EOSINOPHIL # BLD AUTO: 0.07 K/UL
EOSINOPHIL NFR BLD AUTO: 1.3 %
GLUCOSE SERPL-MCNC: 91 MG/DL
HCT VFR BLD CALC: 40.6 %
HDLC SERPL-MCNC: 62 MG/DL
HGB BLD-MCNC: 13.2 G/DL
IMM GRANULOCYTES NFR BLD AUTO: 0.2 %
LDLC SERPL CALC-MCNC: 124 MG/DL
LYMPHOCYTES # BLD AUTO: 2.11 K/UL
LYMPHOCYTES NFR BLD AUTO: 39.5 %
MAN DIFF?: NORMAL
MCHC RBC-ENTMCNC: 29.5 PG
MCHC RBC-ENTMCNC: 32.5 GM/DL
MCV RBC AUTO: 90.8 FL
MONOCYTES # BLD AUTO: 0.48 K/UL
MONOCYTES NFR BLD AUTO: 9 %
NEUTROPHILS # BLD AUTO: 2.62 K/UL
NEUTROPHILS NFR BLD AUTO: 49.1 %
NONHDLC SERPL-MCNC: 135 MG/DL
PLATELET # BLD AUTO: 187 K/UL
POTASSIUM SERPL-SCNC: 4.7 MMOL/L
PROT SERPL-MCNC: 7 G/DL
RBC # BLD: 4.47 M/UL
RBC # FLD: 13.8 %
SODIUM SERPL-SCNC: 140 MMOL/L
TRIGL SERPL-MCNC: 59 MG/DL
TSH SERPL-ACNC: 2.26 UIU/ML
WBC # FLD AUTO: 5.34 K/UL

## 2021-05-05 ENCOUNTER — APPOINTMENT (OUTPATIENT)
Dept: MAMMOGRAPHY | Facility: IMAGING CENTER | Age: 56
End: 2021-05-05
Payer: MEDICARE

## 2021-05-05 ENCOUNTER — OUTPATIENT (OUTPATIENT)
Dept: OUTPATIENT SERVICES | Facility: HOSPITAL | Age: 56
LOS: 1 days | End: 2021-05-05
Payer: MEDICARE

## 2021-05-05 ENCOUNTER — APPOINTMENT (OUTPATIENT)
Dept: RADIOLOGY | Facility: IMAGING CENTER | Age: 56
End: 2021-05-05
Payer: MEDICARE

## 2021-05-05 ENCOUNTER — APPOINTMENT (OUTPATIENT)
Dept: ULTRASOUND IMAGING | Facility: IMAGING CENTER | Age: 56
End: 2021-05-05
Payer: MEDICARE

## 2021-05-05 DIAGNOSIS — Z98.51 TUBAL LIGATION STATUS: Chronic | ICD-10-CM

## 2021-05-05 DIAGNOSIS — Z00.8 ENCOUNTER FOR OTHER GENERAL EXAMINATION: ICD-10-CM

## 2021-05-05 DIAGNOSIS — Z98.891 HISTORY OF UTERINE SCAR FROM PREVIOUS SURGERY: Chronic | ICD-10-CM

## 2021-05-05 PROCEDURE — 76641 ULTRASOUND BREAST COMPLETE: CPT

## 2021-05-05 PROCEDURE — 77080 DXA BONE DENSITY AXIAL: CPT | Mod: 26

## 2021-05-05 PROCEDURE — 77063 BREAST TOMOSYNTHESIS BI: CPT | Mod: 26

## 2021-05-05 PROCEDURE — 76641 ULTRASOUND BREAST COMPLETE: CPT | Mod: 26,50

## 2021-05-05 PROCEDURE — 77067 SCR MAMMO BI INCL CAD: CPT | Mod: 26

## 2021-05-05 PROCEDURE — 76705 ECHO EXAM OF ABDOMEN: CPT | Mod: 26

## 2021-05-05 PROCEDURE — 77067 SCR MAMMO BI INCL CAD: CPT

## 2021-05-05 PROCEDURE — 77080 DXA BONE DENSITY AXIAL: CPT

## 2021-05-05 PROCEDURE — 77063 BREAST TOMOSYNTHESIS BI: CPT

## 2021-05-05 PROCEDURE — 76705 ECHO EXAM OF ABDOMEN: CPT

## 2021-10-25 ENCOUNTER — APPOINTMENT (OUTPATIENT)
Dept: FAMILY MEDICINE | Facility: CLINIC | Age: 56
End: 2021-10-25

## 2022-01-02 ENCOUNTER — APPOINTMENT (OUTPATIENT)
Dept: FAMILY MEDICINE | Facility: CLINIC | Age: 57
End: 2022-01-02
Payer: MEDICARE

## 2022-01-02 VITALS
TEMPERATURE: 97.5 F | OXYGEN SATURATION: 98 % | SYSTOLIC BLOOD PRESSURE: 130 MMHG | DIASTOLIC BLOOD PRESSURE: 70 MMHG | HEART RATE: 69 BPM | RESPIRATION RATE: 16 BRPM

## 2022-01-02 PROCEDURE — 36415 COLL VENOUS BLD VENIPUNCTURE: CPT

## 2022-01-02 PROCEDURE — 99396 PREV VISIT EST AGE 40-64: CPT | Mod: 25

## 2022-01-02 RX ORDER — OMEPRAZOLE 40 MG/1
40 CAPSULE, DELAYED RELEASE ORAL
Qty: 90 | Refills: 1 | Status: DISCONTINUED | COMMUNITY
Start: 2019-06-25 | End: 2022-01-02

## 2022-01-03 LAB
25(OH)D3 SERPL-MCNC: 19 NG/ML
ALBUMIN SERPL ELPH-MCNC: 4.3 G/DL
ALP BLD-CCNC: 83 U/L
ALT SERPL-CCNC: 14 U/L
ANION GAP SERPL CALC-SCNC: 12 MMOL/L
APPEARANCE: CLEAR
AST SERPL-CCNC: 22 U/L
BACTERIA: NEGATIVE
BASOPHILS # BLD AUTO: 0.06 K/UL
BASOPHILS NFR BLD AUTO: 0.8 %
BILIRUB SERPL-MCNC: 0.3 MG/DL
BILIRUBIN URINE: NEGATIVE
BLOOD URINE: NEGATIVE
BUN SERPL-MCNC: 14 MG/DL
CALCIUM SERPL-MCNC: 9.4 MG/DL
CHLORIDE SERPL-SCNC: 104 MMOL/L
CHOLEST SERPL-MCNC: 241 MG/DL
CO2 SERPL-SCNC: 24 MMOL/L
COLOR: YELLOW
CREAT SERPL-MCNC: 1.12 MG/DL
EOSINOPHIL # BLD AUTO: 0.11 K/UL
EOSINOPHIL NFR BLD AUTO: 1.5 %
ESTIMATED AVERAGE GLUCOSE: 120 MG/DL
GLUCOSE QUALITATIVE U: NEGATIVE
GLUCOSE SERPL-MCNC: 87 MG/DL
HBA1C MFR BLD HPLC: 5.8 %
HCT VFR BLD CALC: 40.4 %
HDLC SERPL-MCNC: 68 MG/DL
HGB BLD-MCNC: 12.9 G/DL
HYALINE CASTS: 1 /LPF
IMM GRANULOCYTES NFR BLD AUTO: 0.1 %
KETONES URINE: NEGATIVE
LDLC SERPL CALC-MCNC: 156 MG/DL
LEUKOCYTE ESTERASE URINE: NEGATIVE
LYMPHOCYTES # BLD AUTO: 3.66 K/UL
LYMPHOCYTES NFR BLD AUTO: 48.7 %
MAN DIFF?: NORMAL
MCHC RBC-ENTMCNC: 29.4 PG
MCHC RBC-ENTMCNC: 31.9 GM/DL
MCV RBC AUTO: 92 FL
MICROSCOPIC-UA: NORMAL
MONOCYTES # BLD AUTO: 0.62 K/UL
MONOCYTES NFR BLD AUTO: 8.3 %
NEUTROPHILS # BLD AUTO: 3.05 K/UL
NEUTROPHILS NFR BLD AUTO: 40.6 %
NITRITE URINE: NEGATIVE
NONHDLC SERPL-MCNC: 172 MG/DL
PH URINE: 5.5
PLATELET # BLD AUTO: 201 K/UL
POTASSIUM SERPL-SCNC: 4.1 MMOL/L
PROT SERPL-MCNC: 6.9 G/DL
PROTEIN URINE: NORMAL
RBC # BLD: 4.39 M/UL
RBC # FLD: 14.4 %
RED BLOOD CELLS URINE: 1 /HPF
SODIUM SERPL-SCNC: 140 MMOL/L
SPECIFIC GRAVITY URINE: 1.02
SQUAMOUS EPITHELIAL CELLS: 6 /HPF
TRIGL SERPL-MCNC: 83 MG/DL
TSH SERPL-ACNC: 7.57 UIU/ML
UROBILINOGEN URINE: NORMAL
WBC # FLD AUTO: 7.51 K/UL
WHITE BLOOD CELLS URINE: 1 /HPF

## 2022-03-30 ENCOUNTER — TRANSCRIPTION ENCOUNTER (OUTPATIENT)
Age: 57
End: 2022-03-30

## 2022-04-02 RX ORDER — AZELASTINE HYDROCHLORIDE 0.5 MG/ML
0.05 SOLUTION/ DROPS OPHTHALMIC
Qty: 1 | Refills: 0 | Status: ACTIVE | COMMUNITY
Start: 2022-04-01 | End: 1900-01-01

## 2022-04-04 ENCOUNTER — TRANSCRIPTION ENCOUNTER (OUTPATIENT)
Age: 57
End: 2022-04-04

## 2022-05-09 ENCOUNTER — RESULT REVIEW (OUTPATIENT)
Age: 57
End: 2022-05-09

## 2022-05-16 ENCOUNTER — APPOINTMENT (OUTPATIENT)
Dept: ULTRASOUND IMAGING | Facility: IMAGING CENTER | Age: 57
End: 2022-05-16

## 2022-05-16 ENCOUNTER — APPOINTMENT (OUTPATIENT)
Dept: MAMMOGRAPHY | Facility: IMAGING CENTER | Age: 57
End: 2022-05-16

## 2022-05-25 ENCOUNTER — OUTPATIENT (OUTPATIENT)
Dept: OUTPATIENT SERVICES | Facility: HOSPITAL | Age: 57
LOS: 1 days | End: 2022-05-25
Payer: MEDICARE

## 2022-05-25 ENCOUNTER — APPOINTMENT (OUTPATIENT)
Dept: ULTRASOUND IMAGING | Facility: IMAGING CENTER | Age: 57
End: 2022-05-25
Payer: MEDICARE

## 2022-05-25 ENCOUNTER — APPOINTMENT (OUTPATIENT)
Dept: MAMMOGRAPHY | Facility: IMAGING CENTER | Age: 57
End: 2022-05-25
Payer: MEDICARE

## 2022-05-25 DIAGNOSIS — Z98.51 TUBAL LIGATION STATUS: Chronic | ICD-10-CM

## 2022-05-25 DIAGNOSIS — Z98.891 HISTORY OF UTERINE SCAR FROM PREVIOUS SURGERY: Chronic | ICD-10-CM

## 2022-05-25 DIAGNOSIS — Z00.8 ENCOUNTER FOR OTHER GENERAL EXAMINATION: ICD-10-CM

## 2022-05-25 PROCEDURE — 77067 SCR MAMMO BI INCL CAD: CPT

## 2022-05-25 PROCEDURE — 76641 ULTRASOUND BREAST COMPLETE: CPT

## 2022-05-25 PROCEDURE — 77063 BREAST TOMOSYNTHESIS BI: CPT

## 2022-05-25 PROCEDURE — 77063 BREAST TOMOSYNTHESIS BI: CPT | Mod: 26

## 2022-05-25 PROCEDURE — 76641 ULTRASOUND BREAST COMPLETE: CPT | Mod: 26,50

## 2022-05-25 PROCEDURE — 77067 SCR MAMMO BI INCL CAD: CPT | Mod: 26

## 2022-07-11 NOTE — CONSULT NOTE ADULT - CONSULT REQUESTED BY NAME
pcp Detail Level: Zone Detail Level: Simple Erythromycin Counseling:  I discussed with the patient the risks of erythromycin including but not limited to GI upset, allergic reaction, drug rash, diarrhea, increase in liver enzymes, and yeast infections. Topical Retinoid counseling:  Patient advised to apply a pea-sized amount only at bedtime and wait 30 minutes after washing their face before applying.  If too drying, patient may add a non-comedogenic moisturizer. The patient verbalized understanding of the proper use and possible adverse effects of retinoids.  All of the patient's questions and concerns were addressed. Sarecycline Pregnancy And Lactation Text: This medication is Pregnancy Category D and not consider safe during pregnancy. It is also excreted in breast milk. Bactrim Pregnancy And Lactation Text: This medication is Pregnancy Category D and is known to cause fetal risk.  It is also excreted in breast milk. Topical Sulfur Applications Pregnancy And Lactation Text: This medication is Pregnancy Category C and has an unknown safety profile during pregnancy. It is unknown if this topical medication is excreted in breast milk. Benzoyl Peroxide Counseling: Patient counseled that medicine may cause skin irritation and bleach clothing.  In the event of skin irritation, the patient was advised to reduce the amount of the drug applied or use it less frequently.   The patient verbalized understanding of the proper use and possible adverse effects of benzoyl peroxide.  All of the patient's questions and concerns were addressed. Doxycycline Counseling:  Patient counseled regarding possible photosensitivity and increased risk for sunburn.  Patient instructed to avoid sunlight, if possible.  When exposed to sunlight, patients should wear protective clothing, sunglasses, and sunscreen.  The patient was instructed to call the office immediately if the following severe adverse effects occur:  hearing changes, easy bruising/bleeding, severe headache, or vision changes.  The patient verbalized understanding of the proper use and possible adverse effects of doxycycline.  All of the patient's questions and concerns were addressed. Topical Clindamycin Pregnancy And Lactation Text: This medication is Pregnancy Category B and is considered safe during pregnancy. It is unknown if it is excreted in breast milk. Azithromycin Pregnancy And Lactation Text: This medication is considered safe during pregnancy and is also secreted in breast milk. Azelaic Acid Counseling: Patient counseled that medicine may cause skin irritation and to avoid applying near the eyes.  In the event of skin irritation, the patient was advised to reduce the amount of the drug applied or use it less frequently.   The patient verbalized understanding of the proper use and possible adverse effects of azelaic acid.  All of the patient's questions and concerns were addressed. Dapsone Counseling: I discussed with the patient the risks of dapsone including but not limited to hemolytic anemia, agranulocytosis, rashes, methemoglobinemia, kidney failure, peripheral neuropathy, headaches, GI upset, and liver toxicity.  Patients who start dapsone require monitoring including baseline LFTs and weekly CBCs for the first month, then every month thereafter.  The patient verbalized understanding of the proper use and possible adverse effects of dapsone.  All of the patient's questions and concerns were addressed. High Dose Vitamin A Pregnancy And Lactation Text: High dose vitamin A therapy is contraindicated during pregnancy and breast feeding. Tazorac Pregnancy And Lactation Text: This medication is not safe during pregnancy. It is unknown if this medication is excreted in breast milk. Aklief counseling:  Patient advised to apply a pea-sized amount only at bedtime and wait 30 minutes after washing their face before applying.  If too drying, patient may add a non-comedogenic moisturizer.  The most commonly reported side effects including irritation, redness, scaling, dryness, stinging, burning, itching, and increased risk of sunburn.  The patient verbalized understanding of the proper use and possible adverse effects of retinoids.  All of the patient's questions and concerns were addressed. Birth Control Pills Counseling: Birth Control Pill Counseling: I discussed with the patient the potential side effects of OCPs including but not limited to increased risk of stroke, heart attack, thrombophlebitis, deep venous thrombosis, hepatic adenomas, breast changes, GI upset, headaches, and depression.  The patient verbalized understanding of the proper use and possible adverse effects of OCPs. All of the patient's questions and concerns were addressed. Isotretinoin Pregnancy And Lactation Text: This medication is Pregnancy Category X and is considered extremely dangerous during pregnancy. It is unknown if it is excreted in breast milk. Topical Retinoid Pregnancy And Lactation Text: This medication is Pregnancy Category C. It is unknown if this medication is excreted in breast milk. Tetracycline Counseling: Patient counseled regarding possible photosensitivity and increased risk for sunburn.  Patient instructed to avoid sunlight, if possible.  When exposed to sunlight, patients should wear protective clothing, sunglasses, and sunscreen.  The patient was instructed to call the office immediately if the following severe adverse effects occur:  hearing changes, easy bruising/bleeding, severe headache, or vision changes.  The patient verbalized understanding of the proper use and possible adverse effects of tetracycline.  All of the patient's questions and concerns were addressed. Patient understands to avoid pregnancy while on therapy due to potential birth defects. Benzoyl Peroxide Pregnancy And Lactation Text: This medication is Pregnancy Category C. It is unknown if benzoyl peroxide is excreted in breast milk. Spironolactone Counseling: Patient advised regarding risks of diarrhea, abdominal pain, hyperkalemia, birth defects (for female patients), liver toxicity and renal toxicity. The patient may need blood work to monitor liver and kidney function and potassium levels while on therapy. The patient verbalized understanding of the proper use and possible adverse effects of spironolactone.  All of the patient's questions and concerns were addressed. Bactrim Counseling:  I discussed with the patient the risks of sulfa antibiotics including but not limited to GI upset, allergic reaction, drug rash, diarrhea, dizziness, photosensitivity, and yeast infections.  Rarely, more serious reactions can occur including but not limited to aplastic anemia, agranulocytosis, methemoglobinemia, blood dyscrasias, liver or kidney failure, lung infiltrates or desquamative/blistering drug rashes. Sarecycline Counseling: Patient advised regarding possible photosensitivity and discoloration of the teeth, skin, lips, tongue and gums.  Patient instructed to avoid sunlight, if possible.  When exposed to sunlight, patients should wear protective clothing, sunglasses, and sunscreen.  The patient was instructed to call the office immediately if the following severe adverse effects occur:  hearing changes, easy bruising/bleeding, severe headache, or vision changes.  The patient verbalized understanding of the proper use and possible adverse effects of sarecycline.  All of the patient's questions and concerns were addressed. Erythromycin Pregnancy And Lactation Text: This medication is Pregnancy Category B and is considered safe during pregnancy. It is also excreted in breast milk. Winlevi Counseling:  I discussed with the patient the risks of topical clascoterone including but not limited to erythema, scaling, itching, and stinging. Patient voiced their understanding. Topical Sulfur Applications Counseling: Topical Sulfur Counseling: Patient counseled that this medication may cause skin irritation or allergic reactions.  In the event of skin irritation, the patient was advised to reduce the amount of the drug applied or use it less frequently.   The patient verbalized understanding of the proper use and possible adverse effects of topical sulfur application.  All of the patient's questions and concerns were addressed. Minocycline Counseling: Patient advised regarding possible photosensitivity and discoloration of the teeth, skin, lips, tongue and gums.  Patient instructed to avoid sunlight, if possible.  When exposed to sunlight, patients should wear protective clothing, sunglasses, and sunscreen.  The patient was instructed to call the office immediately if the following severe adverse effects occur:  hearing changes, easy bruising/bleeding, severe headache, or vision changes.  The patient verbalized understanding of the proper use and possible adverse effects of minocycline.  All of the patient's questions and concerns were addressed. Doxycycline Pregnancy And Lactation Text: This medication is Pregnancy Category D and not consider safe during pregnancy. It is also excreted in breast milk but is considered safe for shorter treatment courses. Azelaic Acid Pregnancy And Lactation Text: This medication is considered safe during pregnancy and breast feeding. Azithromycin Counseling:  I discussed with the patient the risks of azithromycin including but not limited to GI upset, allergic reaction, drug rash, diarrhea, and yeast infections. High Dose Vitamin A Counseling: Side effects reviewed, pt to contact office should one occur. Aklief Pregnancy And Lactation Text: It is unknown if this medication is safe to use during pregnancy.  It is unknown if this medication is excreted in breast milk.  Breastfeeding women should use the topical cream on the smallest area of the skin for the shortest time needed while breastfeeding.  Do not apply to nipple and areola. Dapsone Pregnancy And Lactation Text: This medication is Pregnancy Category C and is not considered safe during pregnancy or breast feeding. Use Enhanced Medication Counseling?: No Topical Clindamycin Counseling: Patient counseled that this medication may cause skin irritation or allergic reactions.  In the event of skin irritation, the patient was advised to reduce the amount of the drug applied or use it less frequently.   The patient verbalized understanding of the proper use and possible adverse effects of clindamycin.  All of the patient's questions and concerns were addressed. Tazorac Counseling:  Patient advised that medication is irritating and drying.  Patient may need to apply sparingly and wash off after an hour before eventually leaving it on overnight.  The patient verbalized understanding of the proper use and possible adverse effects of tazorac.  All of the patient's questions and concerns were addressed. Isotretinoin Counseling: Patient should get monthly blood tests, not donate blood, not drive at night if vision affected, not share medication, and not undergo elective surgery for 6 months after tx completed. Side effects reviewed, pt to contact office should one occur. Winlevi Pregnancy And Lactation Text: This medication is considered safe during pregnancy and breastfeeding. Birth Control Pills Pregnancy And Lactation Text: This medication should be avoided if pregnant and for the first 30 days post-partum. Spironolactone Pregnancy And Lactation Text: This medication can cause feminization of the male fetus and should be avoided during pregnancy. The active metabolite is also found in breast milk.

## 2022-10-03 ENCOUNTER — TRANSCRIPTION ENCOUNTER (OUTPATIENT)
Age: 57
End: 2022-10-03

## 2023-01-31 NOTE — ED PROCEDURE NOTE - NS ED PROC PERFORMED BY1 FT
Pt AxOx4, continent and ambulatory with walker. Tele NS/SB, Pt saturating RA at 91-94% so Pt was put on 2L overnight and is saturating 94-95%. No BM yet. Pt is sleeping and has no acute distress. Please verify if Pt will be prescribed spirolactone at discharge.      Problem: RESPIRATORY - ADULT  Goal: Achieves optimal ventilation and oxygenation  Description: INTERVENTIONS:  - Assess for changes in respiratory status  - Assess for changes in mentation and behavior  - Position to facilitate oxygenation and minimize respiratory effort  - Oxygen supplementation based on oxygen saturation or ABGs  - Provide Smoking Cessation handout, if applicable  - Encourage broncho-pulmonary hygiene including cough, deep breathe, Incentive Spirometry  - Assess the need for suctioning and perform as needed  - Assess and instruct to report SOB or any respiratory difficulty  - Respiratory Therapy support as indicated  - Manage/alleviate anxiety  - Monitor for signs/symptoms of CO2 retention  Outcome: Progressing alfred

## 2023-04-18 RX ORDER — OLOPATADINE HCL 1 MG/ML
0.1 SOLUTION/ DROPS OPHTHALMIC TWICE DAILY
Qty: 1 | Refills: 5 | Status: ACTIVE | COMMUNITY
Start: 2022-03-30 | End: 1900-01-01

## 2023-05-16 ENCOUNTER — TRANSCRIPTION ENCOUNTER (OUTPATIENT)
Age: 58
End: 2023-05-16

## 2023-05-22 ENCOUNTER — APPOINTMENT (OUTPATIENT)
Dept: FAMILY MEDICINE | Facility: CLINIC | Age: 58
End: 2023-05-22
Payer: MEDICARE

## 2023-05-22 VITALS
OXYGEN SATURATION: 97 % | DIASTOLIC BLOOD PRESSURE: 66 MMHG | HEART RATE: 67 BPM | TEMPERATURE: 97 F | SYSTOLIC BLOOD PRESSURE: 136 MMHG | WEIGHT: 155 LBS | BODY MASS INDEX: 24.33 KG/M2 | HEIGHT: 67 IN

## 2023-05-22 PROCEDURE — 99214 OFFICE O/P EST MOD 30 MIN: CPT

## 2023-05-30 ENCOUNTER — APPOINTMENT (OUTPATIENT)
Dept: MAMMOGRAPHY | Facility: IMAGING CENTER | Age: 58
End: 2023-05-30
Payer: MEDICARE

## 2023-05-30 ENCOUNTER — OUTPATIENT (OUTPATIENT)
Dept: OUTPATIENT SERVICES | Facility: HOSPITAL | Age: 58
LOS: 1 days | End: 2023-05-30
Payer: MEDICARE

## 2023-05-30 ENCOUNTER — APPOINTMENT (OUTPATIENT)
Dept: ULTRASOUND IMAGING | Facility: IMAGING CENTER | Age: 58
End: 2023-05-30
Payer: MEDICARE

## 2023-05-30 DIAGNOSIS — Z98.891 HISTORY OF UTERINE SCAR FROM PREVIOUS SURGERY: Chronic | ICD-10-CM

## 2023-05-30 DIAGNOSIS — Z98.51 TUBAL LIGATION STATUS: Chronic | ICD-10-CM

## 2023-05-30 DIAGNOSIS — Z00.8 ENCOUNTER FOR OTHER GENERAL EXAMINATION: ICD-10-CM

## 2023-05-30 PROCEDURE — 77067 SCR MAMMO BI INCL CAD: CPT

## 2023-05-30 PROCEDURE — 76641 ULTRASOUND BREAST COMPLETE: CPT | Mod: 26,50

## 2023-05-30 PROCEDURE — 77063 BREAST TOMOSYNTHESIS BI: CPT

## 2023-05-30 PROCEDURE — 76641 ULTRASOUND BREAST COMPLETE: CPT

## 2023-05-30 PROCEDURE — 77063 BREAST TOMOSYNTHESIS BI: CPT | Mod: 26

## 2023-05-30 PROCEDURE — 77067 SCR MAMMO BI INCL CAD: CPT | Mod: 26

## 2023-07-17 ENCOUNTER — APPOINTMENT (OUTPATIENT)
Dept: FAMILY MEDICINE | Facility: CLINIC | Age: 58
End: 2023-07-17
Payer: MEDICARE

## 2023-07-17 VITALS
OXYGEN SATURATION: 97 % | SYSTOLIC BLOOD PRESSURE: 132 MMHG | BODY MASS INDEX: 24.33 KG/M2 | HEIGHT: 67 IN | DIASTOLIC BLOOD PRESSURE: 74 MMHG | HEART RATE: 70 BPM | TEMPERATURE: 97.3 F | WEIGHT: 155 LBS

## 2023-07-17 DIAGNOSIS — Z00.00 ENCOUNTER FOR GENERAL ADULT MEDICAL EXAMINATION W/OUT ABNORMAL FINDINGS: ICD-10-CM

## 2023-07-17 PROCEDURE — 36415 COLL VENOUS BLD VENIPUNCTURE: CPT

## 2023-07-17 PROCEDURE — 99396 PREV VISIT EST AGE 40-64: CPT | Mod: 25

## 2023-07-17 RX ORDER — PANTOPRAZOLE 40 MG/1
40 TABLET, DELAYED RELEASE ORAL
Qty: 90 | Refills: 0 | Status: DISCONTINUED | COMMUNITY
Start: 2022-01-14 | End: 2023-07-17

## 2023-07-17 RX ORDER — LEVOTHYROXINE SODIUM 0.05 MG/1
50 TABLET ORAL DAILY
Qty: 90 | Refills: 1 | Status: DISCONTINUED | COMMUNITY
Start: 2021-03-17 | End: 2023-07-17

## 2023-07-18 LAB
25(OH)D3 SERPL-MCNC: 15.9 NG/ML
ALBUMIN SERPL ELPH-MCNC: 4.1 G/DL
ALP BLD-CCNC: 86 U/L
ALT SERPL-CCNC: 13 U/L
ANION GAP SERPL CALC-SCNC: 11 MMOL/L
APPEARANCE: CLEAR
AST SERPL-CCNC: 20 U/L
BACTERIA: NEGATIVE /HPF
BILIRUB SERPL-MCNC: 0.3 MG/DL
BILIRUBIN URINE: NEGATIVE
BLOOD URINE: NEGATIVE
BUN SERPL-MCNC: 15 MG/DL
CALCIUM SERPL-MCNC: 9.4 MG/DL
CAST: 1 /LPF
CHLORIDE SERPL-SCNC: 104 MMOL/L
CHOLEST SERPL-MCNC: 228 MG/DL
CO2 SERPL-SCNC: 25 MMOL/L
COLOR: YELLOW
CREAT SERPL-MCNC: 1.03 MG/DL
EGFR: 64 ML/MIN/1.73M2
EPITHELIAL CELLS: 4 /HPF
ESTIMATED AVERAGE GLUCOSE: 123 MG/DL
GLUCOSE QUALITATIVE U: NEGATIVE MG/DL
GLUCOSE SERPL-MCNC: 96 MG/DL
HBA1C MFR BLD HPLC: 5.9 %
HDLC SERPL-MCNC: 64 MG/DL
KETONES URINE: NEGATIVE MG/DL
LDLC SERPL CALC-MCNC: 153 MG/DL
LEUKOCYTE ESTERASE URINE: NEGATIVE
MICROSCOPIC-UA: NORMAL
NITRITE URINE: NEGATIVE
NONHDLC SERPL-MCNC: 165 MG/DL
PH URINE: 6
POTASSIUM SERPL-SCNC: 4.1 MMOL/L
PROT SERPL-MCNC: 6.7 G/DL
PROTEIN URINE: NEGATIVE MG/DL
RED BLOOD CELLS URINE: 1 /HPF
SODIUM SERPL-SCNC: 139 MMOL/L
SPECIFIC GRAVITY URINE: 1.01
TRIGL SERPL-MCNC: 68 MG/DL
TSH SERPL-ACNC: 4.56 UIU/ML
UROBILINOGEN URINE: 0.2 MG/DL
WHITE BLOOD CELLS URINE: 2 /HPF

## 2023-07-24 ENCOUNTER — TRANSCRIPTION ENCOUNTER (OUTPATIENT)
Age: 58
End: 2023-07-24

## 2023-10-29 ENCOUNTER — TRANSCRIPTION ENCOUNTER (OUTPATIENT)
Age: 58
End: 2023-10-29

## 2023-10-30 ENCOUNTER — APPOINTMENT (OUTPATIENT)
Dept: FAMILY MEDICINE | Facility: CLINIC | Age: 58
End: 2023-10-30
Payer: MEDICARE

## 2023-10-30 VITALS
HEIGHT: 67 IN | SYSTOLIC BLOOD PRESSURE: 119 MMHG | OXYGEN SATURATION: 99 % | HEART RATE: 70 BPM | WEIGHT: 161 LBS | BODY MASS INDEX: 25.27 KG/M2 | DIASTOLIC BLOOD PRESSURE: 62 MMHG

## 2023-10-30 DIAGNOSIS — I38 ENDOCARDITIS, VALVE UNSPECIFIED: ICD-10-CM

## 2023-10-30 DIAGNOSIS — E78.00 PURE HYPERCHOLESTEROLEMIA, UNSPECIFIED: ICD-10-CM

## 2023-10-30 DIAGNOSIS — I10 ESSENTIAL (PRIMARY) HYPERTENSION: ICD-10-CM

## 2023-10-30 DIAGNOSIS — Z86.73 PERSONAL HISTORY OF TRANSIENT ISCHEMIC ATTACK (TIA), AND CEREBRAL INFARCTION W/OUT RESIDUAL DEFICITS: ICD-10-CM

## 2023-10-30 PROCEDURE — 99213 OFFICE O/P EST LOW 20 MIN: CPT | Mod: 25

## 2024-03-31 NOTE — PROGRESS NOTE ADULT - PROBLEM/PLAN-2
DISPLAY PLAN FREE TEXT
Diet, NPO with Tube Feed:   Tube Feeding Modality: Gastrostomy  Glucerna 1.5 Ian  Total Volume for 24 Hours (mL): 1530  Continuous  Starting Tube Feed Rate {mL per Hour}: 85  Until Goal Tube Feed Rate (mL per Hour): 85  Tube Feed Duration (in Hours): 18  Tube Feed Start Time: 18:00  Volume Based Feeding Titration:  If the patient has achieved and tolerated the prescribed goal rate and tube feeding has been held within a 24hr period, titrate tube feeding rate based on guidelines, up to a maximum rate of 120mL/hr  Free Water Flush  Bolus   Total Volume per Flush (mL): 200   Frequency: Every 8 Hours (03-31-24 @ 02:10)

## 2024-08-12 ENCOUNTER — RESULT REVIEW (OUTPATIENT)
Age: 59
End: 2024-08-12

## 2024-08-22 ENCOUNTER — APPOINTMENT (OUTPATIENT)
Dept: MAMMOGRAPHY | Facility: IMAGING CENTER | Age: 59
End: 2024-08-22
Payer: MEDICARE

## 2024-08-22 ENCOUNTER — RESULT REVIEW (OUTPATIENT)
Age: 59
End: 2024-08-22

## 2024-08-22 ENCOUNTER — APPOINTMENT (OUTPATIENT)
Dept: ULTRASOUND IMAGING | Facility: IMAGING CENTER | Age: 59
End: 2024-08-22
Payer: MEDICARE

## 2024-08-22 ENCOUNTER — APPOINTMENT (OUTPATIENT)
Dept: RADIOLOGY | Facility: IMAGING CENTER | Age: 59
End: 2024-08-22
Payer: MEDICARE

## 2024-08-22 PROCEDURE — 77067 SCR MAMMO BI INCL CAD: CPT | Mod: 26

## 2024-08-22 PROCEDURE — 77080 DXA BONE DENSITY AXIAL: CPT | Mod: 26

## 2024-08-22 PROCEDURE — 77063 BREAST TOMOSYNTHESIS BI: CPT | Mod: 26

## 2024-08-22 PROCEDURE — 76641 ULTRASOUND BREAST COMPLETE: CPT | Mod: 26,50

## 2024-10-14 ENCOUNTER — APPOINTMENT (OUTPATIENT)
Dept: FAMILY MEDICINE | Facility: CLINIC | Age: 59
End: 2024-10-14
Payer: MEDICARE

## 2024-10-14 VITALS
SYSTOLIC BLOOD PRESSURE: 147 MMHG | OXYGEN SATURATION: 98 % | BODY MASS INDEX: 24.8 KG/M2 | DIASTOLIC BLOOD PRESSURE: 73 MMHG | HEART RATE: 78 BPM | TEMPERATURE: 98.3 F | RESPIRATION RATE: 16 BRPM | WEIGHT: 158 LBS | HEIGHT: 67 IN

## 2024-10-14 DIAGNOSIS — E78.00 PURE HYPERCHOLESTEROLEMIA, UNSPECIFIED: ICD-10-CM

## 2024-10-14 DIAGNOSIS — Z86.73 PERSONAL HISTORY OF TRANSIENT ISCHEMIC ATTACK (TIA), AND CEREBRAL INFARCTION W/OUT RESIDUAL DEFICITS: ICD-10-CM

## 2024-10-14 DIAGNOSIS — I10 ESSENTIAL (PRIMARY) HYPERTENSION: ICD-10-CM

## 2024-10-14 DIAGNOSIS — Z00.00 ENCOUNTER FOR GENERAL ADULT MEDICAL EXAMINATION W/OUT ABNORMAL FINDINGS: ICD-10-CM

## 2024-10-14 DIAGNOSIS — E55.9 VITAMIN D DEFICIENCY, UNSPECIFIED: ICD-10-CM

## 2024-10-14 LAB
25(OH)D3 SERPL-MCNC: 20.9 NG/ML
ALBUMIN SERPL ELPH-MCNC: 4.1 G/DL
ALP BLD-CCNC: 86 U/L
ALT SERPL-CCNC: 17 U/L
ANION GAP SERPL CALC-SCNC: 13 MMOL/L
APPEARANCE: CLEAR
AST SERPL-CCNC: 24 U/L
BACTERIA: NEGATIVE /HPF
BASOPHILS # BLD AUTO: 0.03 K/UL
BASOPHILS NFR BLD AUTO: 0.4 %
BILIRUB SERPL-MCNC: 0.3 MG/DL
BILIRUBIN URINE: NEGATIVE
BLOOD URINE: NEGATIVE
BUN SERPL-MCNC: 14 MG/DL
CALCIUM SERPL-MCNC: 9.3 MG/DL
CAST: 0 /LPF
CHLORIDE SERPL-SCNC: 100 MMOL/L
CHOLEST SERPL-MCNC: 218 MG/DL
CO2 SERPL-SCNC: 26 MMOL/L
COLOR: YELLOW
CREAT SERPL-MCNC: 1.19 MG/DL
EGFR: 53 ML/MIN/1.73M2
EOSINOPHIL # BLD AUTO: 0.15 K/UL
EOSINOPHIL NFR BLD AUTO: 1.8 %
EPITHELIAL CELLS: 1 /HPF
ESTIMATED AVERAGE GLUCOSE: 120 MG/DL
GLUCOSE QUALITATIVE U: NEGATIVE MG/DL
GLUCOSE SERPL-MCNC: 90 MG/DL
HBA1C MFR BLD HPLC: 5.8 %
HCT VFR BLD CALC: 42.2 %
HDLC SERPL-MCNC: 64 MG/DL
HGB BLD-MCNC: 13.2 G/DL
IMM GRANULOCYTES NFR BLD AUTO: 0.2 %
KETONES URINE: NEGATIVE MG/DL
LDLC SERPL CALC-MCNC: 140 MG/DL
LEUKOCYTE ESTERASE URINE: NEGATIVE
LYMPHOCYTES # BLD AUTO: 3.97 K/UL
LYMPHOCYTES NFR BLD AUTO: 47.8 %
MAN DIFF?: NORMAL
MCHC RBC-ENTMCNC: 29.3 PG
MCHC RBC-ENTMCNC: 31.3 GM/DL
MCV RBC AUTO: 93.8 FL
MICROSCOPIC-UA: NORMAL
MONOCYTES # BLD AUTO: 0.6 K/UL
MONOCYTES NFR BLD AUTO: 7.2 %
NEUTROPHILS # BLD AUTO: 3.53 K/UL
NEUTROPHILS NFR BLD AUTO: 42.6 %
NITRITE URINE: NEGATIVE
NONHDLC SERPL-MCNC: 154 MG/DL
PH URINE: 6
PLATELET # BLD AUTO: 196 K/UL
POTASSIUM SERPL-SCNC: 4.2 MMOL/L
PROT SERPL-MCNC: 7.1 G/DL
PROTEIN URINE: NEGATIVE MG/DL
RBC # BLD: 4.5 M/UL
RBC # FLD: 14.6 %
RED BLOOD CELLS URINE: 1 /HPF
SODIUM SERPL-SCNC: 138 MMOL/L
SPECIFIC GRAVITY URINE: 1.01
TRIGL SERPL-MCNC: 78 MG/DL
TSH SERPL-ACNC: 6.24 UIU/ML
UROBILINOGEN URINE: 0.2 MG/DL
WBC # FLD AUTO: 8.3 K/UL
WHITE BLOOD CELLS URINE: 0 /HPF

## 2024-10-14 PROCEDURE — G0008: CPT

## 2024-10-14 PROCEDURE — 90662 IIV NO PRSV INCREASED AG IM: CPT

## 2024-10-14 PROCEDURE — 99396 PREV VISIT EST AGE 40-64: CPT

## 2024-10-14 PROCEDURE — 36415 COLL VENOUS BLD VENIPUNCTURE: CPT

## 2024-10-16 RX ORDER — LEVOTHYROXINE SODIUM 0.03 MG/1
25 TABLET ORAL DAILY
Qty: 90 | Refills: 0 | Status: ACTIVE | COMMUNITY
Start: 2024-10-16 | End: 1900-01-01

## 2025-06-17 ENCOUNTER — APPOINTMENT (OUTPATIENT)
Dept: FAMILY MEDICINE | Facility: CLINIC | Age: 60
End: 2025-06-17
Payer: MEDICARE

## 2025-06-17 ENCOUNTER — NON-APPOINTMENT (OUTPATIENT)
Age: 60
End: 2025-06-17

## 2025-06-17 VITALS
BODY MASS INDEX: 26.06 KG/M2 | SYSTOLIC BLOOD PRESSURE: 130 MMHG | DIASTOLIC BLOOD PRESSURE: 80 MMHG | OXYGEN SATURATION: 99 % | HEART RATE: 76 BPM | HEIGHT: 67 IN | TEMPERATURE: 98.3 F | WEIGHT: 166 LBS | RESPIRATION RATE: 16 BRPM

## 2025-06-17 PROCEDURE — 99214 OFFICE O/P EST MOD 30 MIN: CPT

## 2025-08-13 ENCOUNTER — RESULT REVIEW (OUTPATIENT)
Age: 60
End: 2025-08-13

## 2025-09-03 ENCOUNTER — RESULT REVIEW (OUTPATIENT)
Age: 60
End: 2025-09-03

## 2025-09-03 ENCOUNTER — OUTPATIENT (OUTPATIENT)
Dept: OUTPATIENT SERVICES | Facility: HOSPITAL | Age: 60
LOS: 1 days | End: 2025-09-03
Payer: MEDICARE

## 2025-09-03 ENCOUNTER — APPOINTMENT (OUTPATIENT)
Dept: ULTRASOUND IMAGING | Facility: IMAGING CENTER | Age: 60
End: 2025-09-03
Payer: MEDICARE

## 2025-09-03 ENCOUNTER — APPOINTMENT (OUTPATIENT)
Dept: MAMMOGRAPHY | Facility: IMAGING CENTER | Age: 60
End: 2025-09-03
Payer: MEDICARE

## 2025-09-03 DIAGNOSIS — Z98.51 TUBAL LIGATION STATUS: Chronic | ICD-10-CM

## 2025-09-03 DIAGNOSIS — Z00.8 ENCOUNTER FOR OTHER GENERAL EXAMINATION: ICD-10-CM

## 2025-09-03 DIAGNOSIS — Z98.891 HISTORY OF UTERINE SCAR FROM PREVIOUS SURGERY: Chronic | ICD-10-CM

## 2025-09-03 PROCEDURE — 77063 BREAST TOMOSYNTHESIS BI: CPT

## 2025-09-03 PROCEDURE — 77067 SCR MAMMO BI INCL CAD: CPT | Mod: 26

## 2025-09-03 PROCEDURE — 76641 ULTRASOUND BREAST COMPLETE: CPT | Mod: 26,50

## 2025-09-03 PROCEDURE — 77067 SCR MAMMO BI INCL CAD: CPT

## 2025-09-03 PROCEDURE — 77063 BREAST TOMOSYNTHESIS BI: CPT | Mod: 26

## 2025-09-03 PROCEDURE — 76641 ULTRASOUND BREAST COMPLETE: CPT
